# Patient Record
Sex: MALE | Race: WHITE | Employment: OTHER | URBAN - METROPOLITAN AREA
[De-identification: names, ages, dates, MRNs, and addresses within clinical notes are randomized per-mention and may not be internally consistent; named-entity substitution may affect disease eponyms.]

---

## 2017-06-20 ENCOUNTER — HOSPITAL ENCOUNTER (OUTPATIENT)
Dept: PHYSICAL THERAPY | Age: 72
Discharge: HOME | End: 2017-06-20
Payer: MEDICARE

## 2017-06-20 PROCEDURE — 97162 PT EVAL MOD COMPLEX 30 MIN: CPT

## 2017-06-20 PROCEDURE — G8978 MOBILITY CURRENT STATUS: HCPCS

## 2017-06-20 PROCEDURE — G8979 MOBILITY GOAL STATUS: HCPCS

## 2017-06-20 PROCEDURE — 97530 THERAPEUTIC ACTIVITIES: CPT
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
## 2017-06-23 ENCOUNTER — HOSPITAL ENCOUNTER (OUTPATIENT)
Dept: PHYSICAL THERAPY | Age: 72
Discharge: HOME | End: 2017-06-23
Payer: MEDICARE

## 2017-06-23 PROCEDURE — 97113 AQUATIC THERAPY/EXERCISES: CPT

## 2017-06-28 ENCOUNTER — HOSPITAL ENCOUNTER (OUTPATIENT)
Dept: PHYSICAL THERAPY | Age: 72
Discharge: HOME | End: 2017-06-28
Payer: MEDICARE

## 2017-06-28 PROCEDURE — 97110 THERAPEUTIC EXERCISES: CPT

## 2017-06-28 PROCEDURE — 97112 NEUROMUSCULAR REEDUCATION: CPT

## 2017-06-28 NOTE — PROGRESS NOTES
PT DAILY TREATMENT NOTE - [facility]     Patient Name: Marylee Alstrom  Date:2017  : 1945  [x]  Patient  Verified  Payor: Dana Sierra / Plan: [place] MEDICARE PART A & B / Product Type: Medicare /    In time:1100  Out time:1128  Total Treatment Time (min): 28  Total Timed Codes (min): 28  1:1 Treatment Time ( only): 28   Visit #: 3 of 8    Treatment Area: Left knee pain [M25.562]  Low back pain [M54.5]    SUBJECTIVE  Pain Level (0-10 scale): 0/10  Any medication changes, allergies to medications, adverse drug reactions, diagnosis change, or new procedure performed?: [x] No    [] Yes (see summary sheet for update)  Subjective functional status/changes:   [] No changes reported  I go to the [facility] 3 times a week for exercise. The balance work makes me nervous.   OBJECTIVE    Modality rationale:    Min Type Additional Details    [] Estim:  []Unatt       []IFC  []Premod                        []Other:  []w/ice   []w/heat  Position:  Location:    [] Estim: []Att    []TENS instruct  []NMES                    []Other:  []w/US   []w/ice   []w/heat  Position:  Location:    []  Traction: [] Cervical       []Lumbar                       [] Prone          []Supine                       []Intermittent   []Continuous Lbs:  [] before manual  [] after manual    []  Ultrasound: []Continuous   [] Pulsed                           []1MHz   []3MHz W/cm2:  Location:    []  Iontophoresis with dexamethasone         Location: [] Take home patch   [] In clinic    []  Ice     []  heat  []  Ice massage  []  Laser   []  Anodyne Position:  Location:    []  Laser with stim  []  Other:  Position:  Location:    []  Vasopneumatic Device Pressure:       [] lo [] med [] hi   Temperature: [] lo [] med [] hi   [] Skin assessment post-treatment:  []intact []redness- no adverse reaction    []redness  adverse reaction:      min []Eval                  []Re-Eval       18 min Therapeutic Exercise:  [x] See flow sheet :   Rationale: increase strength and improve coordination to improve the patients ability to improve gait and ADL's     min Therapeutic Activity:  []  See flow sheet :        10 min Neuromuscular Re-education:  [x]  See flow sheet :static and dynamic balance   Rationale: increase strength, improve coordination, improve balance and increase proprioception  to improve the patients ability to improve gait and balance for safety with ADL's     min Manual Therapy:          min Gait Training:  ___ feet with ___ device on level surfaces with ___ level of assist   Rationale: With   [] TE   [] TA   [] neuro   [] other: Patient Education: [x] Review HEP    [] Progressed/Changed HEP based on:   [] positioning   [] body mechanics   [] transfers   [] heat/ice application    [] other:      Other Objective/Functional Measures:      Pain Level (0-10 scale) post treatment: 0/10    ASSESSMENT/Changes in Function: Pt ambulates using B canes for balance and is challenged with narrow MARIO. Patient will continue to benefit from skilled PT services to modify and progress therapeutic interventions, address ROM deficits, address strength deficits, analyze and address soft tissue restrictions, analyze and cue movement patterns, address imbalance/dizziness and instruct in home and community integration to attain remaining goals. [x]  See Plan of Care  []  See progress note/recertification  []  See Discharge Summary         Progress towards goals / Updated goals:  Short Term Goals: STG- To be accomplished in 2 week(s):  1. Pt will be independent with HEP to encourage prophylaxis. Eval:to be dispensed visit 2-3  Current:issued aquatic HEP      Long Term Goals: LTG- To be accomplished in 4 week(s):  1. Pt will report back and knee pain at worst do not exceed 3/10 to normalize ADLs. Eval:7  Current: pt reporting no pain today and tolerates all therex well without pain      2.  Pt will be able to perform tandem stance for 15 sec with UE assist to improve ability to stand for ADLs with less UE support. Eval:difficulty assuming and holding  Current: progressing modified tandem      3. Pt hip ext strength will improve to 4+ to allow pt to walk with less pain and instability. Eval:4-  Current: 3 way hip PRE's introduced today      4. Pt FOTO score will increase by >10 points to show improvement in left knee and low back function.   Eval:36 knee, 47 low back  Current: will address at visit 5       PLAN  [x]  Upgrade activities as tolerated     [x]  Continue plan of care  []  Update interventions per flow sheet       []  Discharge due to:_  []  Other:_      Brendan Messer PTA 6/28/2017  10:58 AM    Future Appointments  Date Time Provider [place]   6/28/2017 11:00 AM LUBNA Preston [facility]   6/30/2017 11:00 AM Brendan Messer PTA [facility]   7/5/2017 11:00 AM Ca Rodriguez PT [facility]   7/7/2017 11:00 AM Brendan Messer, PTA [facility]   7/12/2017 11:00 AM Shane Paredes PT [facility]   7/14/2017 11:00 AM Brendan Messer, PTA [facility]   7/19/2017 11:00 AM Shane Paredes, PT [facility]   7/21/2017 11:00 AM Brendan Messer, PTA [facility]

## 2017-06-30 ENCOUNTER — HOSPITAL ENCOUNTER (OUTPATIENT)
Dept: PHYSICAL THERAPY | Age: 72
Discharge: HOME | End: 2017-06-30
Payer: MEDICARE

## 2017-06-30 PROCEDURE — 97113 AQUATIC THERAPY/EXERCISES: CPT

## 2017-07-05 ENCOUNTER — HOSPITAL ENCOUNTER (OUTPATIENT)
Dept: PHYSICAL THERAPY | Age: 72
Discharge: HOME | End: 2017-07-05
Payer: MEDICARE

## 2017-07-05 PROCEDURE — 97110 THERAPEUTIC EXERCISES: CPT

## 2017-07-05 PROCEDURE — 97112 NEUROMUSCULAR REEDUCATION: CPT

## 2017-07-05 NOTE — PROGRESS NOTES
PT DAILY TREATMENT NOTE - [facility]     Patient Name: Juan Pablo Godoy  Date:2017  : 1945  [x]  Patient  Verified  Payor: [place] MEDICARE / Plan: [place] MEDICARE PART A & B / Product Type: Medicare /    In time:1045  Out time:1125  Total Treatment Time (min): 40  Total Timed Codes (min): 40  1:1 Treatment Time ( only): 40   Visit #: 5 of 8    Treatment Area: Left knee pain [M25.562]  Low back pain [M54.5]    SUBJECTIVE  Pain Level (0-10 scale): 2-3/10  Any medication changes, allergies to medications, adverse drug reactions, diagnosis change, or new procedure performed?: [x] No    [] Yes (see summary sheet for update)  Subjective functional status/changes:   [] No changes reported  Moving slow today.     OBJECTIVE    Modality rationale:    Min Type Additional Details    [] Estim:  []Unatt       []IFC  []Premod                        []Other:  []w/ice   []w/heat  Position:  Location:    [] Estim: []Att    []TENS instruct  []NMES                    []Other:  []w/US   []w/ice   []w/heat  Position:  Location:    []  Traction: [] Cervical       []Lumbar                       [] Prone          []Supine                       []Intermittent   []Continuous Lbs:  [] before manual  [] after manual    []  Ultrasound: []Continuous   [] Pulsed                           []1MHz   []3MHz W/cm2:  Location:    []  Iontophoresis with dexamethasone         Location: [] Take home patch   [] In clinic    []  Ice     []  heat  []  Ice massage  []  Laser   []  Anodyne Position:  Location:    []  Laser with stim  []  Other:  Position:  Location:    []  Vasopneumatic Device Pressure:       [] lo [] med [] hi   Temperature: [] lo [] med [] hi   [] Skin assessment post-treatment:  []intact []redness- no adverse reaction    []redness  adverse reaction:      min []Eval                  []Re-Eval       30 min Therapeutic Exercise:  [x] See flow sheet :   Rationale: increase strength to improve the patients ability to normalize ADL's     min Therapeutic Activity:  []  See flow sheet :        10 min Neuromuscular Re-education:  [x]  See flow sheet :   Rationale: improve coordination, improve balance and increase proprioception  to improve the patients ability to normalize gait and function     min Manual Therapy:          min Gait Training:  ___ feet with ___ device on level surfaces with ___ level of assist   Rationale: With   [] TE   [] TA   [] neuro   [] other: Patient Education: [x] Review HEP    [] Progressed/Changed HEP based on:   [] positioning   [] body mechanics   [] transfers   [] heat/ice application    [] other:      Other Objective/Functional Measures: see goal review     Pain Level (0-10 scale) post treatment: 2-3/10    ASSESSMENT/Changes in Function: FOTO score improving for back > knee. Static standing tandem balance also improving. Patient will continue to benefit from skilled PT services to modify and progress therapeutic interventions, address functional mobility deficits, address ROM deficits, address strength deficits, analyze and address soft tissue restrictions, analyze and cue movement patterns, assess and modify postural abnormalities and address imbalance/dizziness to attain remaining goals. []  See Plan of Care  []  See progress note/recertification  []  See Discharge Summary         Progress towards goals / Updated goals:  Short Term Goals: STG- To be accomplished in 2 week(s):  1. Pt will be independent with HEP to encourage prophylaxis. Eval:to be dispensed visit 2-3  Current: progressing with pt also participating in separate exercise class      Long Term Goals: LTG- To be accomplished in 4 week(s):  1. Pt will report back and knee pain at worst do not exceed 3/10 to normalize ADLs. Eval:7  Current: progressin/10 at worst      2. Pt will be able to perform tandem stance for 15 sec with UE assist to improve ability to stand for ADLs with less UE support.   Eval:difficulty assuming and holding  Current: progressing       3. Pt hip ext strength will improve to 4+ to allow pt to walk with less pain and instability. Eval:4-  Current: no change      4. Pt FOTO score will increase by >10 points to show improvement in left knee and low back function.   Eval:36 knee, 47 low back  Current: progressin for knee, 54 for back       PLAN  [x]  Upgrade activities as tolerated     [x]  Continue plan of care  []  Update interventions per flow sheet       []  Discharge due to:_  []  Other:_      Cipriano Jacobo PT 2017  10:46 AM    Future Appointments  Date Time Provider [place]   2017 11:00 AM AVIVA Coles [facility]   2017 11:00 AM LUBNA Ta [facility]   2017 11:00 AM Gian Muro PT LAZARO [facility]   2017 11:00 AM LUBNA [facility]   2017 11:00 AM Gian Muro PT [facility]   2017 11:00 AM LUBNA [facility]

## 2017-07-07 ENCOUNTER — HOSPITAL ENCOUNTER (OUTPATIENT)
Dept: PHYSICAL THERAPY | Age: 72
Discharge: HOME | End: 2017-07-07
Payer: MEDICARE

## 2017-07-07 PROCEDURE — 97113 AQUATIC THERAPY/EXERCISES: CPT

## 2017-07-12 ENCOUNTER — HOSPITAL ENCOUNTER (OUTPATIENT)
Dept: PHYSICAL THERAPY | Age: 72
Discharge: HOME | End: 2017-07-12
Payer: MEDICARE

## 2017-07-12 PROCEDURE — 97530 THERAPEUTIC ACTIVITIES: CPT

## 2017-07-12 PROCEDURE — G8978 MOBILITY CURRENT STATUS: HCPCS

## 2017-07-12 PROCEDURE — G8979 MOBILITY GOAL STATUS: HCPCS

## 2017-07-12 PROCEDURE — 97112 NEUROMUSCULAR REEDUCATION: CPT

## 2017-07-12 NOTE — PROGRESS NOTES
[facility] at [address] [place] [address]  Phone: [phone]   Fax: [phone]    Medicare Progress Report      Patient name: Aly Marinelli     Start of Care: 17  Referral source: Odilon Figueroa*    : 1945  Medical/Treatment Diagnosis: Left knee pain [M25.562]  Low back pain [M54.5]  Onset Date:initial injury , worsening ~2017  Prior Hospitalization: see medical history   Provider#: [id number]   Comorbidities: chronic low back pain, BMI, HTN, history of cancer   Prior Level of Function: instability with gait and pain worsening in last 3 mo, walking with [facility] for 6+ years, in last 3 mo switched to walking with [facility] and knee brace  Medications: Verified on Patient Summary List    Visits from Start of Care: 7    Missed Visits: 0  Reporting Period : 17 to 17    Subjective Reports: Short Term Goals: STG- To be accomplished in 2 week(s):  1. Pt will be independent with HEP to encourage prophylaxis. Eval:to be dispensed visit 2-3  Current:  Reports compliance-- progressing      Long Term Goals: LTG- To be accomplished in 4 week(s):  1. Pt will report back and knee pain at worst do not exceed 3/10 to normalize ADLs. Eval:7  Current: 2-3 on average, sometimes higher pain levels back>knee currently-- progressing      2. Pt will be able to perform tandem stance for 15 sec with UE assist to improve ability to stand for ADLs with less UE support. Eval:difficulty assuming and holding  Current: 15 sec but several errors-- progressing      3. Pt hip ext strength will improve to 4+ to allow pt to walk with less pain and instability. Eval:4-  Current: challenged with hip ext in standing- pain, progressing slowly but no official measure taken      4. Pt FOTO score will increase by >10 points to show improvement in left knee and low back function.   Eval:36 knee, 47 low back  Current: 38 knee, 65 low back --progressing    Key functional changes: FOTO 2 point improvement in knee, 18 improvement in l-spine  Progressing with balance  No falls since IE, remains ambulating with abel SPC      Problems/ barriers to goal attainment: none     Assessment / Recommendations:Pt has attended 7 PT appointments since IE, progressing toward goals. Pt reports remains with LBP and imbalance. Due to deficits, pt would continue to benefit from skilled PT to improve balance, pain, and function. Problem List: pain affecting function, decrease ROM, decrease strength, impaired gait/ balance, decrease ADL/ functional abilitiies, decrease activity tolerance, decrease flexibility/ joint mobility and decrease transfer abilities   Treatment Plan: Therapeutic exercise, Therapeutic activities, Neuromuscular re-education, Physical agent/modality, Gait/balance training, Manual therapy, Aquatic therapy, Patient education, Functional mobility training and Home safety training    Patient Goal (s) has been updated and includes: work on my balance    Updated Goals to be accomplished in 4 weeks:  Cont with unmet goals    Frequency / Duration: Patient to be seen 2 times per week for 4 weeks:    G-Codes (GP)  G-Codes (GP)  Mobility  [id number] Current  CK= 40-59%  [id number] Goal  CJ= 20-39%      The severity rating is based on clinical judgment and the FOTO score. Certification Period: 6/20/17-8/11/17  The severity rating is based on clinical judgement and the FOTO score.       Maria Dolores Pichardo, PT 7/12/2017 1:04 PM

## 2017-07-12 NOTE — PROGRESS NOTES
PT DAILY TREATMENT NOTE - [facility]     Patient Name: Cirilo Chan  Date:2017  : 1945  [x]  Patient  Verified  Payor: Aron Felton / Plan: [place] PART A & B / Product Type: Medicare /    In time:1100  Out time:1135  Total Treatment Time (min): 35  Total Timed Codes (min): 35  1:1 Treatment Time ( [address] only): 35   Visit #: 7 of 8    Treatment Area: Left knee pain [M25.562]  Low back pain [M54.5]    SUBJECTIVE  Pain Level (0-10 scale): 1.5  Any medication changes, allergies to medications, adverse drug reactions, diagnosis change, or new procedure performed?: [x] No    [] Yes (see summary sheet for update)  Subjective functional status/changes:   [] No changes reported  \"It isn't getting any worse. \"    OBJECTIVE    20 min Therapeutic Exercise:  [x] See flow sheet :   Rationale: increase ROM, increase strength, improve coordination, improve balance and increase proprioception to improve the patients ability to perform ADL's with reduced pain levels. 15 min Neuromuscular Re-education:  [x]  See flow sheet :   Rationale: increase ROM, increase strength, improve coordination, improve balance and increase proprioception  to improve the patients ability to perform ADL's with reduced pain levels. With   [] TE   [] TA   [] neuro   [] other: Patient Education: [x] Review HEP    [] Progressed/Changed HEP based on:   [] positioning   [] body mechanics   [] transfers   [] heat/ice application    [] other:      Other Objective/Functional Measures:      Pain Level (0-10 scale) post treatment: 1.5-2    ASSESSMENT/Changes in Function: Pt tolerated session well. Remains very fearful of tandem balance but improving stability per object findings. Pt notes minor subjective improvements. No falls since IE.       Patient will continue to benefit from skilled PT services to modify and progress therapeutic interventions, address functional mobility deficits, address ROM deficits, address strength deficits, analyze and address soft tissue restrictions, analyze and cue movement patterns, analyze and modify body mechanics/ergonomics, assess and modify postural abnormalities and instruct in home and community integration to attain remaining goals. []  See Plan of Care  []  See progress note/recertification  []  See Discharge Summary         Progress towards goals / Updated goals:  Short Term Goals: STG- To be accomplished in 2 week(s):  1. Pt will be independent with HEP to encourage prophylaxis. Eval:to be dispensed visit 2-3  Current:  Reports compliance-- progressing      Long Term Goals: LTG- To be accomplished in 4 week(s):  1. Pt will report back and knee pain at worst do not exceed 3/10 to normalize ADLs. Eval:7  Current: 2-3 on average, sometimes higher pain levels back>knee currently-- progressing      2. Pt will be able to perform tandem stance for 15 sec with UE assist to improve ability to stand for ADLs with less UE support. Eval:difficulty assuming and holding  Current: 15 sec but several errors-- progressing      3. Pt hip ext strength will improve to 4+ to allow pt to walk with less pain and instability. Eval:4-  Current: challenged with hip ext in standing- pain, progressing slowly but no official measure taken      4. Pt FOTO score will increase by >10 points to show improvement in left knee and low back function.   Eval:36 knee, 47 low back  Current: 38 knee, 65 low back --progressing    PLAN  [x]  Upgrade activities as tolerated     []  Continue plan of care  []  Update interventions per flow sheet       []  Discharge due to:_  []  Other:_      Sherren Grinder, PT 7/12/2017  11:05 AM    Future Appointments  Date Time Provider Kai Carbajal   7/14/2017 12:00 PM LUBNA Hernandez THE [facility]   7/19/2017 11:00 AM Sherren Grinder, PT [facility]   7/21/2017 11:00 AM LUBNA Hernandez THE [facility]

## 2017-07-14 ENCOUNTER — HOSPITAL ENCOUNTER (OUTPATIENT)
Dept: PHYSICAL THERAPY | Age: 72
Discharge: HOME | End: 2017-07-14
Payer: MEDICARE

## 2017-07-14 PROCEDURE — 97113 AQUATIC THERAPY/EXERCISES: CPT

## 2017-07-19 ENCOUNTER — HOSPITAL ENCOUNTER (OUTPATIENT)
Dept: PHYSICAL THERAPY | Age: 72
Discharge: HOME | End: 2017-07-19
Payer: MEDICARE

## 2017-07-19 PROCEDURE — 97110 THERAPEUTIC EXERCISES: CPT

## 2017-07-19 PROCEDURE — 97112 NEUROMUSCULAR REEDUCATION: CPT

## 2017-07-19 PROCEDURE — 97530 THERAPEUTIC ACTIVITIES: CPT

## 2017-07-19 NOTE — PROGRESS NOTES
PT DAILY TREATMENT NOTE - [facility]     Patient Name: Georgia Weinstein  Date:2017  : 1945  [x]  Patient  Verified  Payor: Nando Font / Plan: [place] MED[place]ARE PART A & B / Product Type: Medicare /    In time:1040  Out time:1124  Total Treatment Time (min): 44  Total Timed Codes (min): 44  1:1 Treatment Time ( only): 40   Visit #: 9 of 15    Treatment Area: Left knee pain [M25.562]  Low back pain [M54.5]    SUBJECTIVE  Pain Level (0-10 scale): 0  Any medication changes, allergies to medications, adverse drug reactions, diagnosis change, or new procedure performed?: [x] No    [] Yes (see summary sheet for update)  Subjective functional status/changes:   [] No changes reported  \"Just sitting here I don't have any pain. \"    OBJECTIVE    22 min Therapeutic Exercise:  [x] See flow sheet :   Rationale: increase ROM, increase strength, improve coordination, improve balance and increase proprioception to improve the patients ability to perform ADL's with reduced pain levels. 8 min Therapeutic Activity:  [x]  See flow sheet :   Rationale: increase ROM, increase strength, improve coordination, improve balance and increase proprioception  to improve the patients ability to perform ADL's with reduced pain levels. 14 min Neuromuscular Re-education:  [x]  See flow sheet :   Rationale: increase ROM, increase strength, improve coordination, improve balance and increase proprioception  to improve the patients ability to perform ADL's with reduced pain levels.          With   [] TE   [] TA   [] neuro   [] other: Patient Education: [x] Review HEP    [] Progressed/Changed HEP based on:   [] positioning   [] body mechanics   [] transfers   [] heat/ice application    [] other:      Other Objective/Functional Measures: 0 errors with tandem stance     Pain Level (0-10 scale) post treatment: 0    ASSESSMENT/Changes in Function: Instructed, demo, and practiced use of lumbar brace given by MD.  Pt progressing toward all goals and has senior class pt attends 3 days a week. Pt verbalized confidence to d/c to HEP with workout classes. To update FOTO and HEP and d/c next visit. Pt reports significant improvements since IE. Patient will continue to benefit from skilled PT services to modify and progress therapeutic interventions, address functional mobility deficits, address ROM deficits, address strength deficits, analyze and address soft tissue restrictions, analyze and cue movement patterns, analyze and modify body mechanics/ergonomics, assess and modify postural abnormalities and instruct in home and community integration to attain remaining goals. []  See Plan of Care  [x]  See progress note/recertification  []  See Discharge Summary         Progress towards goals / Updated goals:  Short Term Goals: STG- To be accomplished in 2 week(s):  1. Pt will be independent with HEP to encourage prophylaxis. Eval:to be dispensed visit 2-3  Last PN:  Reports compliance-- progressing      Long Term Goals: LTG- To be accomplished in 4 week(s):  1. Pt will report back and knee pain at worst do not exceed 3/10 to normalize ADLs. Eval:7  Last PN: 2-3 on average, sometimes higher pain levels back>knee currently-- progressing  Current: improving pain levels cont to be reported      2. Pt will be able to perform tandem stance for 15 sec with UE assist to improve ability to stand for ADLs with less UE support. Eval:difficulty assuming and holding  Last PN: 15 sec but several errors-- progressing  Current: 20 sec no error but shaking noted--MET      3. Pt hip ext strength will improve to 4+ to allow pt to walk with less pain and instability. Eval:4-  Last PN: challenged with hip ext in standing- pain, progressing slowly but no official measure taken  Current: 4/5-- progressing      4. Pt FOTO score will increase by >10 points to show improvement in left knee and low back function.   Eval:36 knee, 47 low back  Last PN: 38 knee, 65 low back --progressing  Current: address visit 10       PLAN  []  Upgrade activities as tolerated     []  Continue plan of care  []  Update interventions per flow sheet       [x]  Discharge: next visit with FOTO assessment  []  Other:_      Tresa Jurado, AVIVA 7/19/2017  10:36 AM    Future Appointments  Date Time Provider Kai Carbajal   7/19/2017 11:00 AM Sera Tristan [facility]   7/21/2017 12:30 PM John Morrison PTA [facility]

## 2017-12-23 ENCOUNTER — HOSPITAL ENCOUNTER (EMERGENCY)
Age: 72
Discharge: HOME | End: 2017-12-23
Attending: EMERGENCY MEDICINE
Payer: MEDICARE

## 2017-12-23 VITALS
DIASTOLIC BLOOD PRESSURE: 89 MMHG | HEART RATE: 69 BPM | HEIGHT: 68 IN | WEIGHT: 228 LBS | TEMPERATURE: 97.6 F | OXYGEN SATURATION: 97 % | BODY MASS INDEX: 34.56 KG/M2 | SYSTOLIC BLOOD PRESSURE: 138 MMHG | RESPIRATION RATE: 18 BRPM

## 2017-12-23 DIAGNOSIS — M54.16 LUMBAR BACK PAIN WITH RADICULOPATHY AFFECTING RIGHT LOWER EXTREMITY: Primary | ICD-10-CM

## 2017-12-23 PROCEDURE — 99282 EMERGENCY DEPT VISIT SF MDM: CPT

## 2017-12-23 PROCEDURE — 96372 THER/PROPH/DIAG INJ SC/IM: CPT

## 2017-12-23 PROCEDURE — 74011250636 HC RX REV CODE- 250/636: Performed by: PHYSICIAN ASSISTANT

## 2017-12-23 RX ORDER — GUAIFENESIN 100 MG/5ML
81 LIQUID (ML) ORAL DAILY
Status: ON HOLD | COMMUNITY
End: 2020-01-27

## 2017-12-23 RX ORDER — PROPRANOLOL HYDROCHLORIDE 20 MG/1
20 TABLET ORAL DAILY
Status: ON HOLD | COMMUNITY
End: 2020-01-27 | Stop reason: DRUGHIGH

## 2017-12-23 RX ORDER — LANOLIN ALCOHOL/MO/W.PET/CERES
1 CREAM (GRAM) TOPICAL DAILY
COMMUNITY

## 2017-12-23 RX ORDER — NEFAZODONE HYDROCHLORIDE 250 MG/1
250 TABLET ORAL 2 TIMES DAILY
COMMUNITY

## 2017-12-23 RX ORDER — MELATONIN 5 MG
5 CAPSULE ORAL
Status: ON HOLD | COMMUNITY
End: 2020-01-27

## 2017-12-23 RX ORDER — PRAVASTATIN SODIUM 40 MG/1
40 TABLET ORAL
COMMUNITY

## 2017-12-23 RX ORDER — PREDNISONE 10 MG/1
TABLET ORAL
Qty: 21 TAB | Refills: 0 | Status: ON HOLD | OUTPATIENT
Start: 2017-12-23 | End: 2020-01-27

## 2017-12-23 RX ORDER — CYCLOBENZAPRINE HCL 10 MG
10 TABLET ORAL
Status: ON HOLD | COMMUNITY
End: 2020-01-27

## 2017-12-23 RX ORDER — GLUCOSAMINE SULFATE 1500 MG
POWDER IN PACKET (EA) ORAL DAILY
COMMUNITY

## 2017-12-23 RX ADMIN — METHYLPREDNISOLONE SODIUM SUCCINATE 125 MG: 125 INJECTION, POWDER, FOR SOLUTION INTRAMUSCULAR; INTRAVENOUS at 13:50

## 2017-12-23 NOTE — ED PROVIDER NOTES
EMERGENCY DEPARTMENT HISTORY AND PHYSICAL EXAM    Date: 12/23/2017  Patient Name: Constance Thomas    History of Presenting Illness     Chief Complaint   Patient presents with    Back Pain         History Provided By: Patient    Chief Complaint: Hip pain  Duration: 3 Days  Timing:  Worsening  Location: Right Hip  Severity: 2 out of 10  Modifying Factors: pain worsens when walking  Associated Symptoms: HA    Additional History (Context):   1:11 PM  Constance Thomas is a 71 y.o. male with PMHx of HTN and chronic back pain presents to the emergency department C/O right hip pain onset 3 days. Associated sxs include HA. Pt states that he received lidocaine injections in left lower back 3 days ago by Dr. Mauro Young (pain management) for his chronic lower back pain. These injections were to determine if he is a candidate for radiofrequency ablation. He was seen PTA at [address] and was directed to the ED. Since the initial pain it has continuously worsened until last night where he felt a sudden increase of pain (9/10) last night with some radiation and tingling in his right foot; that has since resolved. Pt denies injury or fall, fever, bowel or bladder incontinence, pain at injection site, and any other sxs or complaints. - smoker, - EtOH use, - illicit drug use     PCP: PROVIDER UNKNOWN    Current Outpatient Prescriptions   Medication Sig Dispense Refill    nefazodone (SERZONE) 250 mg tablet Take 250 mg by mouth two (2) times a day.  pravastatin (PRAVACHOL) 40 mg tablet Take 40 mg by mouth nightly.  cyclobenzaprine (FLEXERIL) 10 mg tablet Take 10 mg by mouth three (3) times daily as needed for Muscle Spasm(s).  cholecalciferol (VITAMIN D3) 1,000 unit cap Take  by mouth daily.  aspirin 81 mg chewable tablet Take 81 mg by mouth daily.  propranolol (INDERAL) 20 mg tablet Take 20 mg by mouth three (3) times daily.  melatonin 5 mg cap capsule Take 5 mg by mouth nightly.       glucosamine-chondroitin (ARTHX) 500-400 mg cap Take 1 Cap by mouth daily.  predniSONE (STERAPRED DS) 10 mg dose pack Use per pack instructions. 21 Tab 0       Past History     Past Medical History:  Past Medical History:   Diagnosis Date    Arthritis     Gastrointestinal disorder     Gastric sleeve    History of penile implant 2014    Hypertension        Past Surgical History:  Past Surgical History:   Procedure Laterality Date    HX KNEE REPLACEMENT Right        Family History:  History reviewed. No pertinent family history. Social History:  Social History   Substance Use Topics    Smoking status: Never Smoker    Smokeless tobacco: Never Used    Alcohol use No       Allergies: Allergies   Allergen Reactions    Sulfa (Sulfonamide Antibiotics) Rash         Review of Systems   Review of Systems   Constitutional: Negative for fever. Musculoskeletal: Positive for arthralgias (right hip) and back pain. No pain at injection site   Neurological: Positive for headaches. All other systems reviewed and are negative. Physical Exam     Vitals:    12/23/17 1230   BP: 138/89   Pulse: 69   Resp: 18   Temp: 97.6 °F (36.4 °C)   SpO2: 97%   Weight: 103.4 kg (228 lb)   Height: 5' 8\" (1.727 m)     Physical Exam   Constitutional: He is oriented to person, place, and time. He appears well-developed. Obese, in mild-moderate pain   Neck: Normal range of motion. Neck supple. Cardiovascular: Normal rate, regular rhythm and normal heart sounds. Pulmonary/Chest: Effort normal and breath sounds normal.   Abdominal: Soft. He exhibits no distension. There is no tenderness. Musculoskeletal:        Cervical back: Normal.        Thoracic back: Normal.        Back:    BLE: sensation intact, 2+DP pulses, no foot drop, 5/5 strength dorsi/plantar flexion of both feet. No calf tenderness or swelling. Neurological: He is alert and oriented to person, place, and time. Skin: Skin is warm and dry.  No rash noted. He is not diaphoretic. No erythema. Psychiatric: He has a normal mood and affect. His behavior is normal.   Nursing note and vitals reviewed. Diagnostic Study Results     Labs -   No results found for this or any previous visit (from the past 12 hour(s)). Radiologic Studies -   No orders to display     CT Results  (Last 48 hours)    None        CXR Results  (Last 48 hours)    None            Medical Decision Making   I am the first provider for this patient. I reviewed the vital signs, available nursing notes, past medical history, past surgical history, family history and social history. Vital Signs-Reviewed the patient's vital signs. Pulse Oximetry Analysis - 97% on RA     Cardiac Monitor:  Rate: 69 bpm  Rhythm: NSR    Records Reviewed: Nursing Notes    Provider Notes (Medical Decision Making):     Procedures:  Procedures    ED Course:   1:11 PM Initial assessment performed. The patients presenting problems have been discussed, and they are in agreement with the care plan formulated and outlined with them. I have encouraged them to ask questions as they arise throughout their visit. PROGRESS NOTE:   1:30 PM  Unable to contact Dr. Ryan Roger; he is not listed through [facility] transfer center and office line just has option for voicemail. Will discuss with ED attending. Written by DENITA Gupta .    CONSULT NOTE:   1:36 PM  Glenn Holcomb PA-C spoke with Carina Ron MD   Specialty: ED medicine  Discussed pt's hx, disposition, and available diagnostic and imaging results  in person. Reviewed care plans. Agrees sx are more consistent with sciatica, further imaging not required at this time, particularly due to sx on opposite side of injection. Diagnosis and Disposition       DISCHARGE NOTE:  1:45 PM  Anabel Broody  results have been reviewed with him. He has been counseled regarding his diagnosis, treatment, and plan.   He verbally conveys understanding and agreement of the signs, symptoms, diagnosis, treatment and prognosis and additionally agrees to follow up as discussed. He also agrees with the care-plan and conveys that all of his questions have been answered. I have also provided discharge instructions for him that include: educational information regarding their diagnosis and treatment, and list of reasons why they would want to return to the ED prior to their follow-up appointment, should his condition change. He has been provided with education for proper emergency department utilization. CLINICAL IMPRESSION:    1. Lumbar back pain with radiculopathy affecting right lower extremity        Discussion:    PLAN:  1. D/C Home  2. Discharge Medication List as of 12/23/2017  1:46 PM      START taking these medications    Details   predniSONE (STERAPRED DS) 10 mg dose pack Use per pack instructions. , Print, Disp-21 Tab, R-0         CONTINUE these medications which have NOT CHANGED    Details   nefazodone (SERZONE) 250 mg tablet Take 250 mg by mouth two (2) times a day., Historical Med      pravastatin (PRAVACHOL) 40 mg tablet Take 40 mg by mouth nightly., Historical Med      cyclobenzaprine (FLEXERIL) 10 mg tablet Take 10 mg by mouth three (3) times daily as needed for Muscle Spasm(s). , Historical Med      cholecalciferol (VITAMIN D3) 1,000 unit cap Take  by mouth daily. , Historical Med      aspirin 81 mg chewable tablet Take 81 mg by mouth daily. , Historical Med      propranolol (INDERAL) 20 mg tablet Take 20 mg by mouth three (3) times daily. , Historical Med      melatonin 5 mg cap capsule Take 5 mg by mouth nightly., Historical Med      glucosamine-chondroitin (ARTHX) 500-400 mg cap Take 1 Cap by mouth daily. , Historical Med           3. Follow-up Information     Follow up With Details Comments Contact Info    Jack Cha MD Schedule an appointment as soon as possible for a visit in 2 days For follow up with pain management [address] [facility]      [facility] EMERGENCY DEPT  As needed, if symptoms worsen [address] [id number]  [phone]        _______________________________    Attestations: This note is prepared by Nasima Villa, acting as Scribe for [employer]. [employer], CH[employer]IS:  The scribe's documentation has been prepared under my direction and personally reviewed by me in its entirety.   I confirm that the note above accurately reflects all work, treatment, procedures, and medical decision making performed by me.  _______________________________

## 2017-12-23 NOTE — ED TRIAGE NOTES
Pt reports hx of chronic pain pain r/t arthritis. Pt reports having a lidocaine shot on Wednesday with Dr. Rach Joy as part of the the Radiofrequency ablation procedure for his back. Pt reports next appointment is 1/19/18. Pt reports increased pain and swelling to low back since the procedure. Sepsis Screening completed    (  )Patient meets SIRS criteria. ( X )Patient does not meet SIRS criteria.       SIRS Criteria is achieved when two or more of the following are present   Temperature < 96.8°F (36°C) or > 100.9°F (38.3°C)   Heart Rate > 90 beats per minute   Respiratory Rate > 20 breaths per minute   WBC count > 12,000 or <4,000 or > 10% bands

## 2017-12-23 NOTE — DISCHARGE INSTRUCTIONS

## 2018-10-05 ENCOUNTER — HOSPITAL ENCOUNTER (EMERGENCY)
Age: 73
Discharge: HOME | End: 2018-10-05
Attending: EMERGENCY MEDICINE
Payer: MEDICARE

## 2018-10-05 ENCOUNTER — APPOINTMENT (OUTPATIENT)
Dept: CT IMAGING | Age: 73
End: 2018-10-05
Attending: EMERGENCY MEDICINE
Payer: MEDICARE

## 2018-10-05 VITALS
DIASTOLIC BLOOD PRESSURE: 79 MMHG | HEIGHT: 68 IN | SYSTOLIC BLOOD PRESSURE: 118 MMHG | HEART RATE: 54 BPM | WEIGHT: 240 LBS | RESPIRATION RATE: 18 BRPM | TEMPERATURE: 98.5 F | BODY MASS INDEX: 36.37 KG/M2 | OXYGEN SATURATION: 100 %

## 2018-10-05 DIAGNOSIS — R10.9 RIGHT FLANK PAIN: Primary | ICD-10-CM

## 2018-10-05 DIAGNOSIS — M79.18 MUSCULOSKELETAL PAIN: ICD-10-CM

## 2018-10-05 LAB
ALBUMIN SERPL-MCNC: 3.2 G/DL (ref 3.4–5)
ALBUMIN/GLOB SERPL: 1 {RATIO} (ref 0.8–1.7)
ALP SERPL-CCNC: 53 U/L (ref 45–117)
ALT SERPL-CCNC: 19 U/L (ref 16–61)
ANION GAP SERPL CALC-SCNC: 7 MMOL/L (ref 3–18)
APPEARANCE UR: CLEAR
AST SERPL-CCNC: 17 U/L (ref 15–37)
BACTERIA URNS QL MICRO: ABNORMAL /HPF
BASOPHILS # BLD: 0 K/UL (ref 0–0.1)
BASOPHILS NFR BLD: 0 % (ref 0–2)
BILIRUB SERPL-MCNC: 0.4 MG/DL (ref 0.2–1)
BILIRUB UR QL: NEGATIVE
BUN SERPL-MCNC: 10 MG/DL (ref 7–18)
BUN/CREAT SERPL: 11 (ref 12–20)
CALCIUM SERPL-MCNC: 8.1 MG/DL (ref 8.5–10.1)
CHLORIDE SERPL-SCNC: 107 MMOL/L (ref 100–108)
CK MB CFR SERPL CALC: NORMAL % (ref 0–4)
CK MB SERPL-MCNC: <1 NG/ML (ref 5–25)
CK SERPL-CCNC: 46 U/L (ref 39–308)
CO2 SERPL-SCNC: 26 MMOL/L (ref 21–32)
COLOR UR: YELLOW
CREAT SERPL-MCNC: 0.9 MG/DL (ref 0.6–1.3)
DIFFERENTIAL METHOD BLD: ABNORMAL
EOSINOPHIL # BLD: 0.2 K/UL (ref 0–0.4)
EOSINOPHIL NFR BLD: 3 % (ref 0–5)
EPITH CASTS URNS QL MICRO: ABNORMAL /LPF (ref 0–5)
ERYTHROCYTE [DISTWIDTH] IN BLOOD BY AUTOMATED COUNT: 14.6 % (ref 11.6–14.5)
GLOBULIN SER CALC-MCNC: 3.3 G/DL (ref 2–4)
GLUCOSE SERPL-MCNC: 92 MG/DL (ref 74–99)
GLUCOSE UR STRIP.AUTO-MCNC: NEGATIVE MG/DL
HCT VFR BLD AUTO: 39.7 % (ref 36–48)
HGB BLD-MCNC: 12.4 G/DL (ref 13–16)
HGB UR QL STRIP: NEGATIVE
KETONES UR QL STRIP.AUTO: NEGATIVE MG/DL
LEUKOCYTE ESTERASE UR QL STRIP.AUTO: ABNORMAL
LYMPHOCYTES # BLD: 2.4 K/UL (ref 0.9–3.6)
LYMPHOCYTES NFR BLD: 40 % (ref 21–52)
MAGNESIUM SERPL-MCNC: 2 MG/DL (ref 1.6–2.6)
MCH RBC QN AUTO: 25.1 PG (ref 24–34)
MCHC RBC AUTO-ENTMCNC: 31.2 G/DL (ref 31–37)
MCV RBC AUTO: 80.4 FL (ref 74–97)
MONOCYTES # BLD: 0.4 K/UL (ref 0.05–1.2)
MONOCYTES NFR BLD: 6 % (ref 3–10)
NEUTS SEG # BLD: 3.1 K/UL (ref 1.8–8)
NEUTS SEG NFR BLD: 51 % (ref 40–73)
NITRITE UR QL STRIP.AUTO: NEGATIVE
PH UR STRIP: 7.5 [PH] (ref 5–8)
PLATELET # BLD AUTO: 272 K/UL (ref 135–420)
PMV BLD AUTO: 9.3 FL (ref 9.2–11.8)
POTASSIUM SERPL-SCNC: 4.1 MMOL/L (ref 3.5–5.5)
PROT SERPL-MCNC: 6.5 G/DL (ref 6.4–8.2)
PROT UR STRIP-MCNC: NEGATIVE MG/DL
RBC # BLD AUTO: 4.94 M/UL (ref 4.7–5.5)
RBC #/AREA URNS HPF: ABNORMAL /HPF (ref 0–5)
SODIUM SERPL-SCNC: 140 MMOL/L (ref 136–145)
SP GR UR REFRACTOMETRY: 1.02 (ref 1–1.03)
TROPONIN I SERPL-MCNC: <0.02 NG/ML (ref 0–0.06)
UROBILINOGEN UR QL STRIP.AUTO: 1 EU/DL (ref 0.2–1)
WBC # BLD AUTO: 6.1 K/UL (ref 4.6–13.2)
WBC URNS QL MICRO: ABNORMAL /HPF (ref 0–5)

## 2018-10-05 PROCEDURE — 74011250636 HC RX REV CODE- 250/636: Performed by: EMERGENCY MEDICINE

## 2018-10-05 PROCEDURE — 96374 THER/PROPH/DIAG INJ IV PUSH: CPT

## 2018-10-05 PROCEDURE — 96375 TX/PRO/DX INJ NEW DRUG ADDON: CPT

## 2018-10-05 PROCEDURE — 81001 URINALYSIS AUTO W/SCOPE: CPT | Performed by: EMERGENCY MEDICINE

## 2018-10-05 PROCEDURE — 83735 ASSAY OF MAGNESIUM: CPT | Performed by: EMERGENCY MEDICINE

## 2018-10-05 PROCEDURE — 85025 COMPLETE CBC W/AUTO DIFF WBC: CPT | Performed by: EMERGENCY MEDICINE

## 2018-10-05 PROCEDURE — 82550 ASSAY OF CK (CPK): CPT | Performed by: EMERGENCY MEDICINE

## 2018-10-05 PROCEDURE — 93005 ELECTROCARDIOGRAM TRACING: CPT

## 2018-10-05 PROCEDURE — 99284 EMERGENCY DEPT VISIT MOD MDM: CPT

## 2018-10-05 PROCEDURE — 80053 COMPREHEN METABOLIC PANEL: CPT | Performed by: EMERGENCY MEDICINE

## 2018-10-05 PROCEDURE — 96361 HYDRATE IV INFUSION ADD-ON: CPT

## 2018-10-05 PROCEDURE — 74011636320 HC RX REV CODE- 636/320: Performed by: EMERGENCY MEDICINE

## 2018-10-05 PROCEDURE — 74177 CT ABD & PELVIS W/CONTRAST: CPT

## 2018-10-05 RX ORDER — ONDANSETRON 2 MG/ML
4 INJECTION INTRAMUSCULAR; INTRAVENOUS
Status: COMPLETED | OUTPATIENT
Start: 2018-10-05 | End: 2018-10-05

## 2018-10-05 RX ORDER — KETOROLAC TROMETHAMINE 15 MG/ML
15 INJECTION, SOLUTION INTRAMUSCULAR; INTRAVENOUS
Status: COMPLETED | OUTPATIENT
Start: 2018-10-05 | End: 2018-10-05

## 2018-10-05 RX ADMIN — SODIUM CHLORIDE 1000 ML: 900 INJECTION, SOLUTION INTRAVENOUS at 10:59

## 2018-10-05 RX ADMIN — KETOROLAC TROMETHAMINE 15 MG: 15 INJECTION, SOLUTION INTRAMUSCULAR; INTRAVENOUS at 10:59

## 2018-10-05 RX ADMIN — IOPAMIDOL 100 ML: 612 INJECTION, SOLUTION INTRAVENOUS at 12:40

## 2018-10-05 RX ADMIN — ONDANSETRON 4 MG: 2 INJECTION INTRAMUSCULAR; INTRAVENOUS at 10:59

## 2018-10-05 NOTE — DISCHARGE INSTRUCTIONS

## 2018-10-05 NOTE — ED PROVIDER NOTES
EMERGENCY DEPARTMENT HISTORY AND PHYSICAL EXAM 
 
Date: 10/5/2018 Patient Name: Nevaeh Brush History of Presenting Illness Chief Complaint Patient presents with  Abdominal Pain History Provided By: Patient Chief Complaint: Abd pain Duration: 3 Days Timing:  Worsening Location: RLQ and right flank Quality: Vale Reas Modifying Factors: Worse with movement and palpation Associated Symptoms: decreased appetite and SOB Additional History (Context):  
10:38 AM 
Nevaeh Brush is a 68 y.o. male with PMHX of arthritis, HTN, penile implant, and gastric sleeve who presents to the emergency department C/O sharp RLQ pain that radiates into his right flank x3 days. States that his pain began near his testicles. Associated sxs include decreased appetite and SOB. States that his pain is worsened with movement and palpation. Denies hx of kidney stones. Pt denies N/V, CP, hx of diabetes, and any other sxs or complaints. PCP: Rajwinder Parra MD 
 
Current Outpatient Prescriptions Medication Sig Dispense Refill  nefazodone (SERZONE) 250 mg tablet Take 250 mg by mouth two (2) times a day.  pravastatin (PRAVACHOL) 40 mg tablet Take 40 mg by mouth nightly.  cyclobenzaprine (FLEXERIL) 10 mg tablet Take 10 mg by mouth three (3) times daily as needed for Muscle Spasm(s).  cholecalciferol (VITAMIN D3) 1,000 unit cap Take  by mouth daily.  aspirin 81 mg chewable tablet Take 81 mg by mouth daily.  propranolol (INDERAL) 20 mg tablet Take 20 mg by mouth three (3) times daily.  melatonin 5 mg cap capsule Take 5 mg by mouth nightly.  glucosamine-chondroitin (ARTHX) 500-400 mg cap Take 1 Cap by mouth daily.  predniSONE (STERAPRED DS) 10 mg dose pack Use per pack instructions. 21 Tab 0 Past History Past Medical History: 
Past Medical History:  
Diagnosis Date  Arthritis  Gastrointestinal disorder Gastric sleeve  History of penile implant 2014  Hypertension Past Surgical History: 
Past Surgical History:  
Procedure Laterality Date  HX KNEE REPLACEMENT Right Family History: No family history on file. Social History: 
Social History Substance Use Topics  Smoking status: Never Smoker  Smokeless tobacco: Never Used  Alcohol use No  
 
 
Allergies: Allergies Allergen Reactions  Sulfa (Sulfonamide Antibiotics) Rash Review of Systems Review of Systems Constitutional: Negative for fever. Respiratory: Negative for shortness of breath. Cardiovascular: Negative for chest pain. Gastrointestinal: Positive for abdominal pain (RLQ). Negative for nausea and vomiting. Genitourinary: Positive for flank pain (Right). Neurological: Positive for dizziness. All other systems reviewed and are negative. Physical Exam  
 
Vitals:  
 10/05/18 1032 10/05/18 1345 BP: 131/81 118/79 Pulse: 70 (!) 54 Resp: 14 18 Temp: 97.6 °F (36.4 °C) 98.5 °F (36.9 °C) SpO2: 100% 100% Weight: 108.9 kg (240 lb) Height: 5' 8\" (1.727 m) Physical Exam 
Constitutional: Elderly  male who appears moderately uncomfortable while groaning in pain with movement Head: Normocephalic, Atraumatic Eyes: Pupils are equal, round, and reactive to light, EOMI, no scleral icterus, no conjunctival pallor ENT: Dry mucous membranes, hearing intact Neck: Supple, non-tender Cardiovascular: Regular rate and rhythm, no murmurs, rubs, or gallops Chest: Normal work of breathing and chest excursion bilaterally Lungs: Clear to ausculation bilaterally, no wheezes, no rhonchi Abdomen: Soft, tender over right flank and RLQ,  non distended, normoactive bowel sounds Back: No evidence of trauma or deformity, right sided CVA tenderness Extremities: No evidence of trauma or deformity, no LE edema Skin: Warm and dry, normal cap refill Neuro: Alert and appropriate, CN intact, normal speech, moving all 4 extremities freely and symmetrically Psychiatric: Cooperative, appropriate mood Diagnostic Study Results Labs - Recent Results (from the past 12 hour(s)) CBC WITH AUTOMATED DIFF Collection Time: 10/05/18 11:00 AM  
Result Value Ref Range WBC 6.1 4.6 - 13.2 K/uL  
 RBC 4.94 4.70 - 5.50 M/uL  
 HGB 12.4 (L) 13.0 - 16.0 g/dL HCT 39.7 36.0 - 48.0 % MCV 80.4 74.0 - 97.0 FL  
 MCH 25.1 24.0 - 34.0 PG  
 MCHC 31.2 31.0 - 37.0 g/dL  
 RDW 14.6 (H) 11.6 - 14.5 % PLATELET 730 507 - 624 K/uL MPV 9.3 9.2 - 11.8 FL  
 NEUTROPHILS 51 40 - 73 % LYMPHOCYTES 40 21 - 52 % MONOCYTES 6 3 - 10 % EOSINOPHILS 3 0 - 5 % BASOPHILS 0 0 - 2 %  
 ABS. NEUTROPHILS 3.1 1.8 - 8.0 K/UL  
 ABS. LYMPHOCYTES 2.4 0.9 - 3.6 K/UL  
 ABS. MONOCYTES 0.4 0.05 - 1.2 K/UL  
 ABS. EOSINOPHILS 0.2 0.0 - 0.4 K/UL  
 ABS. BASOPHILS 0.0 0.0 - 0.1 K/UL  
 DF AUTOMATED URINALYSIS W/ RFLX MICROSCOPIC Collection Time: 10/05/18 11:50 AM  
Result Value Ref Range Color YELLOW Appearance CLEAR Specific gravity 1.020 1.005 - 1.030    
 pH (UA) 7.5 5.0 - 8.0 Protein NEGATIVE  NEG mg/dL Glucose NEGATIVE  NEG mg/dL Ketone NEGATIVE  NEG mg/dL Bilirubin NEGATIVE  NEG Blood NEGATIVE  NEG Urobilinogen 1.0 0.2 - 1.0 EU/dL Nitrites NEGATIVE  NEG Leukocyte Esterase TRACE (A) NEG MAGNESIUM Collection Time: 10/05/18 11:50 AM  
Result Value Ref Range Magnesium 2.0 1.6 - 2.6 mg/dL METABOLIC PANEL, COMPREHENSIVE Collection Time: 10/05/18 11:50 AM  
Result Value Ref Range Sodium 140 136 - 145 mmol/L Potassium 4.1 3.5 - 5.5 mmol/L Chloride 107 100 - 108 mmol/L  
 CO2 26 21 - 32 mmol/L Anion gap 7 3.0 - 18 mmol/L Glucose 92 74 - 99 mg/dL BUN 10 7.0 - 18 MG/DL Creatinine 0.90 0.6 - 1.3 MG/DL  
 BUN/Creatinine ratio 11 (L) 12 - 20 GFR est AA >60 >60 ml/min/1.73m2 GFR est non-AA >60 >60 ml/min/1.73m2  Calcium 8.1 (L) 8.5 - 10.1 MG/DL  
 Bilirubin, total 0.4 0.2 - 1.0 MG/DL  
 ALT (SGPT) 19 16 - 61 U/L  
 AST (SGOT) 17 15 - 37 U/L Alk. phosphatase 53 45 - 117 U/L Protein, total 6.5 6.4 - 8.2 g/dL Albumin 3.2 (L) 3.4 - 5.0 g/dL Globulin 3.3 2.0 - 4.0 g/dL A-G Ratio 1.0 0.8 - 1.7 URINE MICROSCOPIC ONLY Collection Time: 10/05/18 11:50 AM  
Result Value Ref Range WBC 0 to 3 0 - 5 /hpf  
 RBC 0 to 3 0 - 5 /hpf Epithelial cells 1+ 0 - 5 /lpf Bacteria FEW (A) NEG /hpf  
EKG, 12 LEAD, INITIAL Collection Time: 10/05/18  1:42 PM  
Result Value Ref Range Ventricular Rate 54 BPM  
 Atrial Rate 54 BPM  
 P-R Interval 190 ms QRS Duration 104 ms Q-T Interval 458 ms QTC Calculation (Bezet) 434 ms Calculated P Axis 72 degrees Calculated R Axis -3 degrees Calculated T Axis 25 degrees Diagnosis Sinus bradycardia Otherwise normal ECG No previous ECGs available Radiologic Studies -  
CT ABD PELV W CONT Final Result IMPRESSION: 
 
1.  No acute intra-abdominal or pelvic abnormality. 2. Normal caliber small and large intestine, to include a normal appendix. 3. Scattered colonic diverticula without findings of diverticulitis. 4. Sleeve gastrectomy. As read by the radiologist.  
 
CT Results  (Last 48 hours) 10/05/18 1252  CT ABD PELV W CONT Final result Impression:  IMPRESSION:  
   
1. No acute intra-abdominal or pelvic abnormality. 2. Normal caliber small and large intestine, to include a normal appendix. 3. Scattered colonic diverticula without findings of diverticulitis. 4. Sleeve gastrectomy. Narrative:  EXAM: CT of the abdomen and pelvis INDICATION: 68year-old patient with right lower quadrant abdominal pain. COMPARISON: None. TECHNIQUE: Axial CT imaging of the abdomen and pelvis was performed without oral  
and with intravenous contrast. Multiplanar reformats were generated. One or more dose reduction techniques were used on this CT: automated exposure  
control, adjustment of the mAs and/or kVp according to patient's size, and  
iterative reconstruction techniques. The specific techniques utilized on this CT  
exam have been documented in the patient's electronic medical record.   
   
_______________ FINDINGS:  
   
LOWER CHEST: Minor atelectasis is present at the lung bases. No pneumonic  
opacity or pleural effusion. Normal cardiac size. No pericardial effusion. LIVER, BILIARY: Hepatic enhancement is uniform. Several bilateral hepatic  
hypodensities are noted, too small to further characterize, statistically  
favored to represent small cysts. Multiple punctate granulomata present. No  
biliary dilation. Gallbladder is unremarkable. PANCREAS: Normal.  
   
SPLEEN: Multiple splenic granulomata present. ADRENALS: Normal.  
   
KIDNEYS/URETERS/BLADDER: No hydronephrosis or nephrolithiasis. No distal  
ureteral or bladder stone. Urinary bladder is decompressed and normal in CT  
appearance. PELVIC ORGANS: Partial visualization of a penile prosthesis and right-sided  
extraperitoneal reservoir. Small fat-containing umbilical hernia is noted  
bilaterally. VASCULATURE: Minor aortobiiliac atherosclerotic calcification is present without  
evidence of aneurysmal dilatation. LYMPH NODES: There are scattered subcentimeter mesenteric and retroperitoneal  
lymph nodes present without evidence of abdominal or pelvic adenopathy. GASTROINTESTINAL TRACT: Postop changes from gastric sleeve surgical procedure  
noted. Normal caliber small and large intestine. No bowel obstruction. No free  
intraperitoneal gas. Normal appendix. Scattered colonic diverticula are noted  
without evidence of diverticulitis. BONES: No acute or aggressive osseous abnormalities identified. Lower lumbar spondylosis and facet joint osteoarthrosis most pronounced L4-S1.  
   
OTHER: None.  
   
_______________ CXR Results  (Last 48 hours) None Medications given in the ED- Medications  
sodium chloride 0.9 % bolus infusion 1,000 mL (0 mL IntraVENous IV Completed 10/5/18 1159)  
ketorolac (TORADOL) injection 15 mg (15 mg IntraVENous Given 10/5/18 1059) ondansetron (ZOFRAN) injection 4 mg (4 mg IntraVENous Given 10/5/18 1059) iopamidol (ISOVUE 300) 61 % contrast injection 100 mL (100 mL IntraVENous Given 10/5/18 1240) Medical Decision Making I am the first provider for this patient. I reviewed the vital signs, available nursing notes, past medical history, past surgical history, family history and social history. Vital Signs-Reviewed the patient's vital signs. Pulse Oximetry Analysis - 100% on room air EKG interpretation: (Preliminary) 1:42 PM  
54 bpm. Sinus bradycardia. QT/QTc at 458/434 ms. No CECILIO. 
EKG read by Antonio Ortiz DO at 1:44 PM 
 
Records Reviewed: Nursing Notes and Old Medical Records Provider Notes (Medical Decision Making): 69 y/o male who presents with 3 days of rt flnak pain. On exam, he rogers mod uncomf, pain worse with movement w/ right flank and right CVA tenderness. My clini keith that this is likely kidney stones and thus will ob UA and screening lab work. Will also obtain CT scan with contrst to eval for other intraabd pathology. Will give Zofran and Toradol for sxs relief. Procedures: 
Procedures ED Course:  
10:38 AM Initial assessment performed. The patients presenting problems have been discussed, and they are in agreement with the care plan formulated and outlined with them. I have encouraged them to ask questions as they arise throughout their visit. 1:45 PM Labwork and UA reassuring. CT NAD with scattered diverticuli but no diverticulitis. On reassessment patient laying comfortably.  He reports feeling much better. D/C with strict return precautions. Diagnosis and Disposition DISCHARGE NOTE: 
1:45 PM 
Al Serrano  results have been reviewed with him. He has been counseled regarding his diagnosis, treatment, and plan. He verbally conveys understanding and agreement of the signs, symptoms, diagnosis, treatment and prognosis and additionally agrees to follow up as discussed. He also agrees with the care-plan and conveys that all of his questions have been answered. I have also provided discharge instructions for him that include: educational information regarding their diagnosis and treatment, and list of reasons why they would want to return to the ED prior to their follow-up appointment, should his condition change. He has been provided with education for proper emergency department utilization. CLINICAL IMPRESSION: 
 
1. Right flank pain 2. Musculoskeletal pain PLAN: 
1. D/C Home 2. Current Discharge Medication List  
  
 
3. Follow-up Information Follow up With Details Comments Contact Hang Hazel MD Schedule an appointment as soon as possible for a visit in 2 days For PCP follow up [address] [address] 
[phone] [facility] EMERGENCY DEPT Go to As needed, If symptoms worsen [address] [facility] [id number] 
[phone]  
  
 
_______________________________ Attestations: This note is prepared by Jenny Kohli, acting as Scribe for Jose Roberto Contreras DO. Jose Roberto Contreras DO:  The scribe's documentation has been prepared under my direction and personally reviewed by me in its entirety. I confirm that the note above accurately reflects all work, treatment, procedures, and medical decision making performed by me. 
_______________________________

## 2018-10-05 NOTE — ED NOTES
Pt hourly rounding competed. Safety 
 Pt (**)resting on stretcher with side rails up and call bell in reach. () in chair 
  () in parents arms. Toileting Pt offered ()Bedpan 
   (**)Assistance to Restroom 
   ()Urinal 
Ongoing Updates Updated on plan of care and status of test results. Pain Management Inquired as to comfort and offered comfort measures: 
  (**) warm blankets (**) dimmed lights

## 2018-10-05 NOTE — ED TRIAGE NOTES
Pt ambulated into er with complaints of lower right abdominal pain x 3 days describe pain as sharp pain worse with movement. Pt went to see Dr. Selena Garnett and was told to come to the ER.

## 2018-11-24 LAB
ATRIAL RATE: 54 BPM
CALCULATED P AXIS, ECG09: 72 DEGREES
CALCULATED R AXIS, ECG10: -3 DEGREES
CALCULATED T AXIS, ECG11: 25 DEGREES
DIAGNOSIS, 93000: NORMAL
P-R INTERVAL, ECG05: 190 MS
Q-T INTERVAL, ECG07: 458 MS
QRS DURATION, ECG06: 104 MS
QTC CALCULATION (BEZET), ECG08: 434 MS
VENTRICULAR RATE, ECG03: 54 BPM

## 2020-01-24 RX ORDER — ATROPINE SULFATE 0.1 MG/ML
0.5 INJECTION INTRAVENOUS
Status: CANCELLED | OUTPATIENT
Start: 2020-01-24 | End: 2020-01-25

## 2020-01-24 RX ORDER — DIPHENHYDRAMINE HYDROCHLORIDE 50 MG/ML
50 INJECTION, SOLUTION INTRAMUSCULAR; INTRAVENOUS ONCE
Status: CANCELLED | OUTPATIENT
Start: 2020-01-24 | End: 2020-01-24

## 2020-01-24 RX ORDER — EPINEPHRINE 0.1 MG/ML
1 INJECTION INTRACARDIAC; INTRAVENOUS
Status: CANCELLED | OUTPATIENT
Start: 2020-01-24 | End: 2020-01-25

## 2020-01-24 RX ORDER — SODIUM CHLORIDE 0.9 % (FLUSH) 0.9 %
5-40 SYRINGE (ML) INJECTION EVERY 8 HOURS
Status: CANCELLED | OUTPATIENT
Start: 2020-01-24

## 2020-01-24 RX ORDER — DEXTROMETHORPHAN/PSEUDOEPHED 2.5-7.5/.8
1.2 DROPS ORAL
Status: CANCELLED | OUTPATIENT
Start: 2020-01-24

## 2020-01-24 RX ORDER — SODIUM CHLORIDE 0.9 % (FLUSH) 0.9 %
5-40 SYRINGE (ML) INJECTION AS NEEDED
Status: CANCELLED | OUTPATIENT
Start: 2020-01-24

## 2020-01-27 ENCOUNTER — HOSPITAL ENCOUNTER (OUTPATIENT)
Age: 75
Setting detail: OUTPATIENT SURGERY
Discharge: HOME | End: 2020-01-27
Attending: INTERNAL MEDICINE | Admitting: INTERNAL MEDICINE
Payer: MEDICARE

## 2020-01-27 VITALS
OXYGEN SATURATION: 96 % | WEIGHT: 249.9 LBS | SYSTOLIC BLOOD PRESSURE: 128 MMHG | HEART RATE: 60 BPM | RESPIRATION RATE: 16 BRPM | BODY MASS INDEX: 37.87 KG/M2 | TEMPERATURE: 97.8 F | DIASTOLIC BLOOD PRESSURE: 86 MMHG | HEIGHT: 68 IN

## 2020-01-27 PROCEDURE — G0500 MOD SEDAT ENDO SERVICE >5YRS: HCPCS | Performed by: INTERNAL MEDICINE

## 2020-01-27 PROCEDURE — 77030040361 HC SLV COMPR DVT MDII -B: Performed by: INTERNAL MEDICINE

## 2020-01-27 PROCEDURE — 74011250636 HC RX REV CODE- 250/636: Performed by: INTERNAL MEDICINE

## 2020-01-27 PROCEDURE — 76040000007: Performed by: INTERNAL MEDICINE

## 2020-01-27 PROCEDURE — 99153 MOD SED SAME PHYS/QHP EA: CPT | Performed by: INTERNAL MEDICINE

## 2020-01-27 RX ORDER — MIDAZOLAM HYDROCHLORIDE 1 MG/ML
.25-5 INJECTION, SOLUTION INTRAMUSCULAR; INTRAVENOUS
Status: DISCONTINUED | OUTPATIENT
Start: 2020-01-27 | End: 2020-01-27 | Stop reason: HOSPADM

## 2020-01-27 RX ORDER — FLUMAZENIL 0.1 MG/ML
0.2 INJECTION INTRAVENOUS
Status: DISCONTINUED | OUTPATIENT
Start: 2020-01-27 | End: 2020-01-27 | Stop reason: HOSPADM

## 2020-01-27 RX ORDER — FENTANYL CITRATE 50 UG/ML
100 INJECTION, SOLUTION INTRAMUSCULAR; INTRAVENOUS
Status: DISCONTINUED | OUTPATIENT
Start: 2020-01-27 | End: 2020-01-27 | Stop reason: HOSPADM

## 2020-01-27 RX ORDER — NALOXONE HYDROCHLORIDE 0.4 MG/ML
0.4 INJECTION, SOLUTION INTRAMUSCULAR; INTRAVENOUS; SUBCUTANEOUS
Status: DISCONTINUED | OUTPATIENT
Start: 2020-01-27 | End: 2020-01-27 | Stop reason: HOSPADM

## 2020-01-27 RX ORDER — SODIUM CHLORIDE 9 MG/ML
1000 INJECTION, SOLUTION INTRAVENOUS CONTINUOUS
Status: DISCONTINUED | OUTPATIENT
Start: 2020-01-27 | End: 2020-01-27 | Stop reason: HOSPADM

## 2020-01-27 RX ORDER — PROPRANOLOL HYDROCHLORIDE 60 MG/1
60 CAPSULE, EXTENDED RELEASE ORAL DAILY
COMMUNITY

## 2020-01-27 RX ADMIN — SODIUM CHLORIDE 1000 ML: 900 INJECTION, SOLUTION INTRAVENOUS at 11:42

## 2020-01-27 NOTE — PROCEDURES
Esophagogastroduodenoscopy Procedure Note      Eulogio Hauser  1945  [id number]    Indication: Patient of Dr. Quang Mata  referred  for dysphagia. Onset approx 2 months ago. Progressive Dysphagia is to solids. Episodes occur with each meal and resolved with both regurgitation and continuing swallowing. Pt reports chronic chest pain. No weight loss and patient is cautious when eating. No nausea or vomiting. Pt reports mild chronic rare GERD and takes Tagamet  daily with significant relief. Barium swallow as of 12/04/2019 revealed esophageal dysmotility, diverticulum of distal esophagus, small hiatal hernia. Medical hx includes depression, HTN, hyperlipidemia. Surgical hx remarkable for right knee replacement, gastric sleeve,    : Montana Reyez MD    Referring Provider:  Rigo Gil MD    Sedation:   Versed 5 and Fentanyl 100 mcg IV      Procedure Details:  After infomed consent was obtained for the procedure, with all risks and benefits of procedure explained the patient was taken to the endoscopy suite and placed in the left lateral decubitus position. Following sequential administration of sedation as per above, the endoscope was inserted into the mouth and advanced under direct vision to proximal jejunum. A careful inspection was made as the gastroscope was withdrawn, including a retroflexed view of the proximal stomach; findings and interventions are described below. OROPHARYNX: Cords and pyriform recesses normal.   ESOPHAGUS: The proximal and mid esophagus are normal. Distal esophageal motility disorder with some spasms and one single tiny esophagea diverticulum. Mild distal esophageal stenosis from scarring. Moderate distal esophageal ring at the EG junction at 38 cm permitting the passage of the gastroscope. 3 cm hiatal hernia between 38 to 41 - 43 cm. Esophageal dilatation is made with Savary bougies # 38, 42 and 39 Fr which passed without great resistance or obvious trauma . STOMACH:  Sp adequate sleeve gastrectomy with normal mucosa. DUODENUM: The bulb, second, third, forth portions proximal jejunum and major papilla are normal.  Therapies:  esophageal dilation with Savary bougies sizes 38, 42 and 45 Fr    Specimen: none           Complications:   None    EBL:  None. IMPRESSION:  Distal esophageal motility disorder with some spasms and one single tiny esophagea diverticulum. Mild distal esophageal stenosis from scarring. Moderate distal esophageal ring at the EG-junction at 38 cm permitting the passage of the gastroscope. 3 cm hiatal hernia between 38 to 41 - 43 cm. Sp adequate sleeve gastrectomy. Esophageal dilatation is made with Savary bougies # 38, 42 and 45 Fr which passed without great resistance or obvious trauma   Recommendations: -Acid suppression with a proton pump inhibitor such as Omeprazole 20 mg daily for at least 3 months then as needed. , -Follow symptoms. , -chew well food and avoid dense meat. GERD diet: avoid fried and fatty foods.  peppermint, chocolate, alcohol, coffee, citrus fruits and juices, tomoato products; avoid lying down for 2 to 3 hours after eating., -Follow up with me., -No NSAID'S    Selma August MD  1/27/2020  12:02 PM

## 2020-01-27 NOTE — H&P
Assessment/Plan  # Detail Type Description    1. Assessment Difficulty in swallowing NOS (R13.10). Patient Plan  69year old patient of Dr. Jaquan Cardoza  referred  for dysphagia. Onset approx 2 months ago. Dysphagia is to solids and liquids. Episodes occur with each meal and resolved with both regurgitation and continuing swallowing. Pt reports chronic chest pain. No weight loss and patient is cautious when eating. No nausea or vomiting. Pt reports chronic GERD and takes Tagamet  daily with significant relief. Barium swallow as of 12/04/2019 revealed esophageal dysmotility, diverticulum of distal esophagus, small hiatal hernia. Patient reports sulfa allergies but no herbal consumption. Medical hx includes depression, HTN, hyperlipidemia. No significant cardiac, pulmonary, GI, , renal, hepatic, musculoskeletal, or endocrine issues. Surgical hx remarkable for right knee replacement, gastric sleeve, penile implant. Denies tobacco and ETOH use. No significant weight gains or losses in the last 3-6 months. No heat or cold intolerances. Patient states  no N/V/D, fever, chills, sick contacts, SOB, abdominal or chest pains. No dysphagia, appetite is good which consists of 3 meals per day. PLAN: EGD scheduled Pt advised to drink prior to eating and in between bites of food, chew foods to soft consistency. Avoid NSAID's, tobacco and ETOH consumption. I explained the procedure of upper endoscopy or EGD, the alternative and the risks involved which include but not limited to aspiration, bleeding perforation or reaction to sedation. Patient was agreeable to this. 2. Assessment Diarrhea (R19.7). Patient Plan 69year old male referred by Dr. Brent Cain seen for diarrhea. Onset approx a month ago. BM are 3-4 stools a day that are described as #6-7 on [place]atte stool scale. Stools occur after meals. No fecal incontinence. No blood or mucus. No abdominal pain, fevers or chills, nausea, or vomiting.      Pt has not tried any treatments for relief. No tobacco or ETOH use. consumes 2 cups of coffee per daily, and consumes moderate dairy. PLAN: Imodium as needed. R/O infectious etiology. C-Diff, Ova & Parasite, Stool Cx, Celiac, WBC Stool. Plan Orders C difficile Toxins A+B, EIA to be performed. , Celiac Disease Panel to be performed., Giardia, EIA; Ova/Parasite to be performed. , Stool Culture to be performed. and White Blood Cells (WBC), Stool to be performed. This 69year old male presents for Abnormal Barium Swallow, Dyspepsia and Diarrhea. History of Present Illness:  1. Abnormal Barium Swallow   2. Dyspepsia   Onset: 1 month ago. Severity level: severe. It occurs daily. The problem is with no change. Symptom is aggravated by food. Relieving factors include antacids. Pertinent negatives include, abdominal distention, anorexia, bloating, dysphagia, early satiety, epigastric pain, GI bleeding, heartburn, milk/dairy intolerance, postprandial fullness, vomiting and weight loss. 3.  Diarrhea   Onset: 1 month ago. The patient describes it as loose and brown. It occurs constantly. He denies aggravating factors. He denies relieving factors. Associated symptoms include flatulence. Pertinent negatives include abdominal pain, anorexia, bloating, blood in stool, change in appetite, cramping (abdominal), decreased urine output, distention (abdominal), fecal incontinence, fever, joint pain, nausea, rash, tenesmus, vomiting and weight loss.           PROBLEM LIST:     Problem Description Onset Date Chronic Clinical Status Notes   Esophageal diverticulum 12/05/2019 Y     Low back pain 09/28/2017 Y     Obesity 03/22/2016 Y     Insomnia 09/28/2017 Y     Anemia 09/20/2018 N     BPH w/ lower urinary tract symptom 03/21/2019 Y     OA - Osteoarthritis of knee 06/01/2016 Y     Erectile dysfunction 06/01/2016 Y     History of bariatric surgery 06/01/2016 Y     BPH - Benign prostatic hypertrophy 06/01/2016 Y Essential tremor 02/10/2017 Y     Colonoscopy refused 2016 Y     Chronic depression 2016 Y     Mixed hyperlipidemia 2016 Y     H/O: alcoholism 2016 Y     Hiatal hernia 2019 Y     Esophageal dysmotility 2019 Y     Restless legs  N               PAST MEDICAL/SURGICAL HISTORY   (Detailed)    Disease/disorder Onset Date Management Date Comments     Penile implant  [facility] 2016 -     Gastric sleeve  [facility] 2016 -     Knee replacement  [facility] 2016 -   BPH  TURP  [facility] 2016 -         Family History  (Detailed)  Relationship Family Member Name  Age at Death Condition Onset Age Cause of Death       No family history of Hypertension  N       No family history of Renal disease  N       No family history of Asthma  N       No family history of Cancer, prostate  N       Family history of Anxiety  N       No family history of Cancer, colon  N       No family history of Coronary artery disease  N       No family history of Liver disease  N       No family history of Anxiety  N       No family history of Stroke  N       No family history of Depression  N       No family history of Diabetes mellitus type 2  N       Family history of Depression  N       No family history of Hyperlipidemia  N   Brother    Alcoholism  N   Brother    Hypertension  N   Brother    Lymphoma 46 Y   Father    Cancer, lung  Y   Mother    Rheumatoid arthritis  N   Mother    Alzheimer's disease  N         Social History:  (Detailed)  The patient is right-handed. Preferred language is [place]. Born in [facility]. EDUCATION/EMPLOYMENT/OCCUPATION  The patient has a(n) graduate school education. Earned a Master's degree. Employment History Status Retired Restrictions   [employer]  retired       MARITAL STATUS/FAMILY/SOCIAL SUPPORT  Marital status:    CHILDREN  Has children:  Smoking status: Never smoker. ALCOHOL  There is no history of alcohol use.    CAFFEINE  The patient uses caffeine: coffee - 2 cups a day. Holiness/SPIRITUAL  The patient has [facility] [employer] affiliation. Medications (active prior to today)  Medication Name Sig Description Start Date Stop Date Refilled Rx Elsewhere   Centrum Silver 0.4 mg-300 mcg-250 mcg tablet take 1 Tablet by Oral route  every day //   Y   Tylenol Extra Strength 500 mg tablet take 2 tablet by oral route  every 6 hours as needed //  03/22/2018 Y   Smita Aspirin 325 mg tablet  09/20/2018   N   Inderal LA 80 mg capsule,extended release take 1 capsule by oral route  every day 03/13/2019 03/13/2019 N   gabapentin 100 mg capsule 1 cap po daily for RLS 03/13/2019 03/13/2019 N   nefazodone 250 mg tablet take 1/2 tablet by oral route 2 times every day 09/20/2019 09/20/2019 N   PRAVASTATIN TABS 40MG TAKE 1 TABLET DAILY EVERY EVENING 10/28/2019  10/28/2019 N   Tagamet  mg tablet take 1 tablet by oral route 2 times every day 30 minutes before meals 11/15/2019   N   nystatin 100,000 unit/gram topical cream apply by topical route 2 times every day to the affected area(s) for up to 3-4 weeks until resolved 11/15/2019   N     Patient Status   Completed with information received for patient in a summary of care record.        Medications (Added, Continued or Stopped today)  Start Date Medication Directions PRN Status PRN Reason Instruction Stop Date   09/20/2018 Smita Aspirin 325 mg tablet  N       Centrum Silver 0.4 mg-300 mcg-250 mcg tablet take 1 Tablet by Oral route  every day N      03/13/2019 gabapentin 100 mg capsule 1 cap po daily for RLS N      03/13/2019 Inderal LA 80 mg capsule,extended release take 1 capsule by oral route  every day N      09/20/2019 nefazodone 250 mg tablet take 1/2 tablet by oral route 2 times every day N      11/15/2019 nystatin 100,000 unit/gram topical cream apply by topical route 2 times every day to the affected area(s) for up to 3-4 weeks until resolved N      10/28/2019 PRAVASTATIN TABS 40MG TAKE 1 TABLET DAILY EVERY EVENING N      11/15/2019 Tagamet  mg tablet take 1 tablet by oral route 2 times every day 30 minutes before meals N       Tylenol Extra Strength 500 mg tablet take 2 tablet by oral route  every 6 hours as needed N        Allergies:  Ingredient Reaction (Severity) Medication Name Comment   SULFA (SULFONAMIDE ANTIBIOTICS)          ORDERS:  Status Lab Order Time Frame Comments   ordered C difficile Toxins A+B, EIA     ordered Celiac Disease Panel     ordered Giardia, EIA; Ova/Parasite     ordered White Blood Cells (WBC), Stool     ordered Stool Culture       Review of System  System Neg/Pos Details   Constitutional Negative Chills, Fever, Malaise and Weight loss. ENMT Negative Ear infections, Nasal congestion, Sinus Infection and Sore throat. Eyes Negative Double vision and Eye pain. Respiratory Negative Asthma, Chronic cough, Dyspnea, Pleuritic pain and Wheezing. Cardio Negative Chest pain, Edema and Irregular heartbeat/palpitations. GI Positive Flatulence. GI Negative Abdominal cramping, Abdominal distention, Abdominal pain, Anorexia, Bloating, Blood in stool, Change in appetite, Change in bowel habits, Constipation, Decreased appetite, Diarrhea, Dysphagia, Early satiety, Epigastric pain, Fecal incontinence, Gastrointestinal bleeding, Heartburn, Hematemesis, Hematochezia, Melena, Milk/diary intolerance, Nausea, Post prandial fullness, Reflux, Tenesmus and Vomiting.  Negative Decreased urine output, Dysuria, Hematuria, Urinary frequency, Urinary incontinence and Urinary retention. Endocrine Negative Cold intolerance, Gynecomastia, Heat intolerance and Increased thirst.   Neuro Negative Dizziness, Headache, Numbness, Tremors and Vertigo. Psych Negative Anxiety, Depression and Increased stress. Integumentary Negative Hives, Pruritus and Rash. MS Negative Back pain, Joint pain and Myalgia. Hema/Lymph Negative Easy bleeding, Easy bruising and Lymphadenopathy. Allergic/Immuno Negative Chemicals in work place, Contact allergy, Food allergies, Immunosuppression and Seasonal allergies. Reproductive Negative Penile discharge and Sexual dysfunction. Vital Signs   Height  Time ft in cm Last Measured Height Position   3:04 PM 5.0 8.00 172.72 01/23/2020 Standing     MAP (Calculated) Arterial Line 1 BP (mmHg) BP Patient Position Resp SpO2 O2 Device O2 Flow Rate (L/min) Pre/Post Ductal Weight       01/27/20 1126 97.4 °F (36.3 °C) 62 127/90 102   16 93 % Room air   113.4 kg (249 lb 14.4 oz)       PHYSICAL EXAM:  Exam Findings Details   Constitutional Normal Well developed. Eyes Normal Conjunctiva - Right: Normal, Left: Normal. Sclera - Right: Normal, Left: Normal.   Nasopharynx Normal Lips/teeth/gums - Normal. Buccal mucosa - Normal.   Neck Exam Normal Inspection - Normal. Palpation - Normal. Thyroid gland - Normal.   Respiratory Normal Inspection - Normal. Auscultation - Normal.   Cardiovascular Normal Regular rate and rhythm. No murmurs, gallops, or rubs. Vascular Normal Pulses - Radial: Normal, Brachial: Normal, Dorsalis pedis: Normal, Posterior tibial: Normal.   Abdomen Normal Inspection - Normal. Appliance(s) - None. Abdominal muscles - Normal. Auscultation - Normal. Percussion - Normal. Anterior palpation - Normal, No guarding. Umbilicus - Normal. No abdominal tenderness. No hepatic enlargement. No spleen enlargement. No hernia. No ascites. Roa's sign - Negative. No hepatic tenderness. No hepatic bruit. Skin Normal Inspection - Normal.   Musculoskeletal Normal Hands/Wrist - Right: Normal, Left: Normal.   Extremity Normal No edema. Neurological Normal Fine motor skills - Normal.   Psychiatric Normal Orientation - Oriented to time, place, person & situation. Appropriate mood and affect.        No change in H&P

## 2020-01-27 NOTE — DISCHARGE INSTRUCTIONS
Gilmer Martinez  [id number]  1945    EGD DISCHARGE INSTRUCTIONS  Discomfort:  Sore throat- throat lozenges or warm salt water gargle  redness at IV site- apply warm compress to area; if redness or soreness persist- contact your physician  Gaseous discomfort- walking, belching will help relieve any discomfort  You may not operate a vehicle until the next day  You may not engage in an occupation involving machinery or appliances until the next day  You may not drink alcoholic beverages until the next day  Avoid making any critical decisions for at least 24 hour    DIET   You may not resume your regular diet. Antireflux diet. ACTIVITY  You may not resume your normal daily activities   Spend the remainder of the day resting -  avoid any strenuous activity. CALL M.D. ANY SIGN OF   Increasing pain, nausea, vomiting  Abdominal distension (swelling)  New increased bleeding (oral or rectal)  Fever (chills)  Pain in chest area  Bloody discharge from nose or mouth  Shortness of breath     You may ever take any Advil, Aspirin, Ibuprofen, Motrin, Aleve, or Goodys  ONLY  Tylenol as needed for pain. Follow-up Instructions: Follow-up in the office as scheduled or make a follow-up appointment in 2 weeks. Nikki Fung MD  January 27, 2020      DISCHARGE SUMMARY from Nurse    PATIENT INSTRUCTIONS:    After general anesthesia or intravenous sedation, for 24 hours or while taking prescription Narcotics:  · Limit your activities  · Do not drive and operate hazardous machinery  · Do not make important personal or business decisions  · Do  not drink alcoholic beverages  · If you have not urinated within 8 hours after discharge, please contact your surgeon on call.     Report the following to your surgeon:  · Excessive pain, swelling, redness or odor of or around the surgical area  · Temperature over 100.5  · Nausea and vomiting lasting longer than 4 hours or if unable to take medications  · Any signs of decreased circulation or nerve impairment to extremity: change in color, persistent  numbness, tingling, coldness or increase pain  · Any questions    What to do at Home:  Recommended activity: as above,     If you experience any of the following symptoms as above, please follow up with Dr. Monika Noyola. *  Please give a list of your current medications to your Primary Care Provider. *  Please update this list whenever your medications are discontinued, doses are      changed, or new medications (including over-the-counter products) are added. *  Please carry medication information at all times in case of emergency situations. These are general instructions for a healthy lifestyle:    No smoking/ No tobacco products/ Avoid exposure to second hand smoke  Surgeon General's Warning:  Quitting smoking now greatly reduces serious risk to your health. Obesity, smoking, and sedentary lifestyle greatly increases your risk for illness    A healthy diet, regular physical exercise & weight monitoring are important for maintaining a healthy lifestyle    You may be retaining fluid if you have a history of heart failure or if you experience any of the following symptoms:  Weight gain of 3 pounds or more overnight or 5 pounds in a week, increased swelling in our hands or feet or shortness of breath while lying flat in bed. Please call your doctor as soon as you notice any of these symptoms; do not wait until your next office visit. The discharge information has been reviewed with the patient. The patient verbalized understanding. Discharge medications reviewed with the patient and appropriate educational materials and side effects teaching were provided.   ___________________________________________________________________________________________________________________________________  Patient armband removed and shredded

## 2021-08-27 ENCOUNTER — APPOINTMENT (OUTPATIENT)
Dept: CT IMAGING | Age: 76
End: 2021-08-27
Attending: EMERGENCY MEDICINE
Payer: MEDICARE

## 2021-08-27 ENCOUNTER — HOSPITAL ENCOUNTER (EMERGENCY)
Age: 76
Discharge: HOME | End: 2021-08-27
Attending: EMERGENCY MEDICINE
Payer: MEDICARE

## 2021-08-27 ENCOUNTER — APPOINTMENT (OUTPATIENT)
Dept: GENERAL RADIOLOGY | Age: 76
End: 2021-08-27
Attending: EMERGENCY MEDICINE
Payer: MEDICARE

## 2021-08-27 VITALS
WEIGHT: 265 LBS | TEMPERATURE: 98 F | SYSTOLIC BLOOD PRESSURE: 125 MMHG | DIASTOLIC BLOOD PRESSURE: 93 MMHG | HEART RATE: 71 BPM | BODY MASS INDEX: 40.16 KG/M2 | HEIGHT: 68 IN | OXYGEN SATURATION: 96 % | RESPIRATION RATE: 17 BRPM
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
## 2021-08-27 DIAGNOSIS — S20.212A CONTUSION OF LEFT CHEST WALL, INITIAL ENCOUNTER: ICD-10-CM

## 2021-08-27 LAB
ANION GAP SERPL CALC-SCNC: 6 MMOL/L (ref 3–18)
BASOPHILS # BLD: 0 K/UL (ref 0–0.1)
BASOPHILS NFR BLD: 0 % (ref 0–2)
BUN SERPL-MCNC: 14 MG/DL (ref 7–18)
BUN/CREAT SERPL: 13 (ref 12–20)
CALCIUM SERPL-MCNC: 8.6 MG/DL (ref 8.5–10.1)
CHLORIDE SERPL-SCNC: 110 MMOL/L (ref 100–111)
CO2 SERPL-SCNC: 25 MMOL/L (ref 21–32)
CREAT SERPL-MCNC: 1.11 MG/DL (ref 0.6–1.3)
DIFFERENTIAL METHOD BLD: ABNORMAL
EOSINOPHIL # BLD: 0.3 K/UL (ref 0–0.4)
EOSINOPHIL NFR BLD: 4 % (ref 0–5)
ERYTHROCYTE [DISTWIDTH] IN BLOOD BY AUTOMATED COUNT: 17.2 % (ref 11.6–14.5)
GLUCOSE SERPL-MCNC: 150 MG/DL (ref 74–99)
HCT VFR BLD AUTO: 34.8 % (ref 36–48)
HGB BLD-MCNC: 10.4 G/DL (ref 13–16)
LYMPHOCYTES # BLD: 2.7 K/UL (ref 0.9–3.6)
LYMPHOCYTES NFR BLD: 37 % (ref 21–52)
MCH RBC QN AUTO: 21.9 PG (ref 24–34)
MCHC RBC AUTO-ENTMCNC: 29.9 G/DL (ref 31–37)
MCV RBC AUTO: 73.4 FL (ref 78–100)
MONOCYTES # BLD: 0.5 K/UL (ref 0.05–1.2)
MONOCYTES NFR BLD: 6 % (ref 3–10)
NEUTS SEG # BLD: 3.9 K/UL (ref 1.8–8)
NEUTS SEG NFR BLD: 52 % (ref 40–73)
PLATELET # BLD AUTO: 289 K/UL (ref 135–420)
PMV BLD AUTO: 9.5 FL (ref 9.2–11.8)
POTASSIUM SERPL-SCNC: 4.1 MMOL/L (ref 3.5–5.5)
RBC # BLD AUTO: 4.74 M/UL (ref 4.35–5.65)
SODIUM SERPL-SCNC: 141 MMOL/L (ref 136–145)
WBC # BLD AUTO: 7.4 K/UL (ref 4.6–13.2)

## 2021-08-27 PROCEDURE — 80048 BASIC METABOLIC PNL TOTAL CA: CPT

## 2021-08-27 PROCEDURE — 96374 THER/PROPH/DIAG INJ IV PUSH: CPT

## 2021-08-27 PROCEDURE — 85025 COMPLETE CBC W/AUTO DIFF WBC: CPT

## 2021-08-27 PROCEDURE — 74011000636 HC RX REV CODE- 636: Performed by: EMERGENCY MEDICINE

## 2021-08-27 PROCEDURE — 71260 CT THORAX DX C+: CPT

## 2021-08-27 PROCEDURE — 93005 ELECTROCARDIOGRAM TRACING: CPT

## 2021-08-27 PROCEDURE — 99284 EMERGENCY DEPT VISIT MOD MDM: CPT

## 2021-08-27 PROCEDURE — 74011250637 HC RX REV CODE- 250/637: Performed by: EMERGENCY MEDICINE

## 2021-08-27 PROCEDURE — 71045 X-RAY EXAM CHEST 1 VIEW: CPT

## 2021-08-27 RX ORDER — NAPROXEN 500 MG/1
500 TABLET ORAL 2 TIMES DAILY WITH MEALS
Qty: 14 TABLET | Refills: 0 | Status: SHIPPED | OUTPATIENT
Start: 2021-08-27 | End: 2021-09-03

## 2021-08-27 RX ORDER — HYDROCODONE BITARTRATE AND ACETAMINOPHEN 5; 325 MG/1; MG/1
1 TABLET ORAL ONCE
Status: COMPLETED | OUTPATIENT
Start: 2021-08-27 | End: 2021-08-27

## 2021-08-27 RX ADMIN — IOPAMIDOL 100 ML: 612 INJECTION, SOLUTION INTRAVENOUS at 19:08

## 2021-08-27 RX ADMIN — HYDROCODONE BITARTRATE AND ACETAMINOPHEN 1 TABLET: 5; 325 TABLET ORAL at 16:05

## 2021-08-27 NOTE — ED NOTES
Report received from FANN[facility], [address] [facility], assumed care of patient, patient taken by CT tech for scan, family at bedside
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
## 2021-08-28 LAB
ATRIAL RATE: 73 BPM
CALCULATED P AXIS, ECG09: 26 DEGREES
CALCULATED R AXIS, ECG10: 19 DEGREES
CALCULATED T AXIS, ECG11: 59 DEGREES
DIAGNOSIS, 93000: NORMAL
P-R INTERVAL, ECG05: 168 MS
Q-T INTERVAL, ECG07: 402 MS
QRS DURATION, ECG06: 94 MS
QTC CALCULATION (BEZET), ECG08: 442 MS
VENTRICULAR RATE, ECG03: 73 BPM

## 2021-08-28 NOTE — ED NOTES
Patient discharged home after explanation of discharge instructions, patient and wife vocalized understanding of instructions.

## 2025-01-20 ENCOUNTER — APPOINTMENT (OUTPATIENT)
Facility: HOSPITAL | Age: 80
End: 2025-01-20
Payer: MEDICARE

## 2025-01-20 ENCOUNTER — HOSPITAL ENCOUNTER (EMERGENCY)
Facility: HOSPITAL | Age: 80
Discharge: HOME | End: 2025-01-20
Attending: EMERGENCY MEDICINE
Payer: MEDICARE

## 2025-01-20 VITALS
BODY MASS INDEX: 36.98 KG/M2 | WEIGHT: 244 LBS | HEIGHT: 68 IN | DIASTOLIC BLOOD PRESSURE: 80 MMHG | RESPIRATION RATE: 19 BRPM | HEART RATE: 102 BPM | SYSTOLIC BLOOD PRESSURE: 134 MMHG | OXYGEN SATURATION: 96 % | TEMPERATURE: 97.7 F

## 2025-01-20 DIAGNOSIS — E86.0 DEHYDRATION: ICD-10-CM

## 2025-01-20 DIAGNOSIS — R00.0 TACHYCARDIA: Primary | ICD-10-CM

## 2025-01-20 LAB
ALBUMIN SERPL-MCNC: 3.4 G/DL (ref 3.4–5)
ALBUMIN/GLOB SERPL: 1.1 (ref 0.8–1.7)
ALP SERPL-CCNC: 82 U/L (ref 45–117)
ALT SERPL-CCNC: 26 U/L (ref 16–61)
ANION GAP SERPL CALC-SCNC: 6 MMOL/L (ref 3–18)
AST SERPL-CCNC: 23 U/L (ref 10–38)
BASOPHILS # BLD: 0.02 K/UL (ref 0–0.1)
BASOPHILS NFR BLD: 0.3 % (ref 0–2)
BILIRUB SERPL-MCNC: 0.4 MG/DL (ref 0.2–1)
BUN SERPL-MCNC: 12 MG/DL (ref 7–18)
BUN/CREAT SERPL: 13 (ref 12–20)
CALCIUM SERPL-MCNC: 9.3 MG/DL (ref 8.5–10.1)
CHLORIDE SERPL-SCNC: 107 MMOL/L (ref 100–111)
CO2 SERPL-SCNC: 28 MMOL/L (ref 21–32)
CREAT SERPL-MCNC: 0.89 MG/DL (ref 0.6–1.3)
DIFFERENTIAL METHOD BLD: NORMAL
EOSINOPHIL # BLD: 0.1 K/UL (ref 0–0.4)
EOSINOPHIL NFR BLD: 1.5 % (ref 0–5)
ERYTHROCYTE [DISTWIDTH] IN BLOOD BY AUTOMATED COUNT: 13.8 % (ref 11.6–14.5)
GLOBULIN SER CALC-MCNC: 3 G/DL (ref 2–4)
GLUCOSE SERPL-MCNC: 153 MG/DL (ref 74–99)
HCT VFR BLD AUTO: 46.6 % (ref 36–48)
HGB BLD-MCNC: 15.7 G/DL (ref 13–16)
IMM GRANULOCYTES # BLD AUTO: 0.03 K/UL (ref 0–0.04)
IMM GRANULOCYTES NFR BLD AUTO: 0.5 % (ref 0–0.5)
LYMPHOCYTES # BLD: 2.26 K/UL (ref 0.9–3.3)
LYMPHOCYTES NFR BLD: 34.7 % (ref 21–52)
MAGNESIUM SERPL-MCNC: 2 MG/DL (ref 1.6–2.6)
MCH RBC QN AUTO: 31.1 PG (ref 24–34)
MCHC RBC AUTO-ENTMCNC: 33.7 G/DL (ref 31–37)
MCV RBC AUTO: 92.3 FL (ref 78–100)
MONOCYTES # BLD: 0.49 K/UL (ref 0.05–1.2)
MONOCYTES NFR BLD: 7.5 % (ref 3–10)
NEUTS SEG # BLD: 3.62 K/UL (ref 1.8–8)
NEUTS SEG NFR BLD: 55.5 % (ref 40–73)
NRBC # BLD: 0 K/UL (ref 0–0.01)
NRBC BLD-RTO: 0 PER 100 WBC
PLATELET # BLD AUTO: 229 K/UL (ref 135–420)
PMV BLD AUTO: 9.3 FL (ref 9.2–11.8)
POTASSIUM SERPL-SCNC: 3.7 MMOL/L (ref 3.5–5.5)
PROT SERPL-MCNC: 6.4 G/DL (ref 6.4–8.2)
RBC # BLD AUTO: 5.05 M/UL (ref 4.35–5.65)
SODIUM SERPL-SCNC: 141 MMOL/L (ref 136–145)
TROPONIN I SERPL HS-MCNC: 18 NG/L (ref 0–78)
TROPONIN I SERPL HS-MCNC: 19 NG/L (ref 0–78)
WBC # BLD AUTO: 6.5 K/UL (ref 4.6–13.2)

## 2025-01-20 PROCEDURE — 99285 EMERGENCY DEPT VISIT HI MDM: CPT

## 2025-01-20 PROCEDURE — 80053 COMPREHEN METABOLIC PANEL: CPT

## 2025-01-20 PROCEDURE — 93005 ELECTROCARDIOGRAM TRACING: CPT | Performed by: EMERGENCY MEDICINE

## 2025-01-20 PROCEDURE — 71045 X-RAY EXAM CHEST 1 VIEW: CPT

## 2025-01-20 PROCEDURE — 84484 ASSAY OF TROPONIN QUANT: CPT

## 2025-01-20 PROCEDURE — 85025 COMPLETE CBC W/AUTO DIFF WBC: CPT

## 2025-01-20 PROCEDURE — 96361 HYDRATE IV INFUSION ADD-ON: CPT

## 2025-01-20 PROCEDURE — 96360 HYDRATION IV INFUSION INIT: CPT

## 2025-01-20 PROCEDURE — 83735 ASSAY OF MAGNESIUM: CPT

## 2025-01-20 PROCEDURE — 2580000003 HC RX 258: Performed by: EMERGENCY MEDICINE

## 2025-01-20 RX ORDER — 0.9 % SODIUM CHLORIDE 0.9 %
1000 INTRAVENOUS SOLUTION INTRAVENOUS ONCE
Status: COMPLETED | OUTPATIENT
Start: 2025-01-20 | End: 2025-01-20

## 2025-01-20 RX ADMIN — SODIUM CHLORIDE 1000 ML: 9 INJECTION, SOLUTION INTRAVENOUS at 15:11

## 2025-01-20 ASSESSMENT — LIFESTYLE VARIABLES
HOW OFTEN DO YOU HAVE A DRINK CONTAINING ALCOHOL: NEVER
HOW MANY STANDARD DRINKS CONTAINING ALCOHOL DO YOU HAVE ON A TYPICAL DAY: PATIENT DOES NOT DRINK

## 2025-01-20 NOTE — ED TRIAGE NOTES
Pt alert and conversational. Ambulated to triage with cane. C/O palpitations/racing heart. Endorses tightness in chest since last night.     Active Ambulatory Problems     Diagnosis Date Noted    No Active Ambulatory Problems     Resolved Ambulatory Problems     Diagnosis Date Noted    No Resolved Ambulatory Problems     Past Medical History:   Diagnosis Date    Arthritis     Cancer (HCC)     Gastrointestinal disorder     History of penile implant 2014    Hypertension     Sleep apnea

## 2025-01-20 NOTE — ED PROVIDER NOTES
patients with HEART Score of 4 the option of discharge, but only when they meet criteria for \"Low 4,\" meaning that HST was used, and the 4 is not from a highly suspicious story, highly suspicious EKG, or positive cardiac enzymes.  In these selected cases, the risk of a \"Low 4\" is still most likely lower than the risk of admission and further testing/imaging. [id number]    SHARED DECISION MAKING:   I discussed my risk assessment with the patient. The patient understands and consents to the risk of disposition/plan, as well as the risk of uncertainty in estimating outcomes. [id number]          Is this patient to be included in the SEP-1 core measure? No Exclusion criteria - the patient is NOT to be included for SEP-1 Core Measure due to: 2+ SIRS criteria are not met    DDX: ACS, Arrhythmia, Esophageal Rupture/Adeola Cbaral Tear, Musculoskeletal Chest Pain, Pericardial Effusion/Tamponade, Pericarditis, Pneumomediastinum, Pneumonia, PTX/Tension PTX     DISCUSSION:  This appears to be a moderate condition.  This appears to be an acute condition.    79 y.o. male with palpitations starting last night.  In evaluation of the above differential diagnosis, consideration was given to the following tests and treatments.  CBC within normal limits.  CMP unremarkable.  Magnesium unremarkable.  Patient had 2 EKGs without ischemia or arrhythmia.  Chest x-ray was unremarkable.  Patient had 2 troponins in the normal range and not uptrending.  Very unlikely ischemia or arrhythmia given this workup.  Heart rate normalized with IV fluids. Likely tachycardia secondary to dehydration. The decision to perform testing and results were discussed with the patient and spouse.  I discussed each of these tests and considerations with the patient and spouse. They agree with the plan of discharge.      ADDITIONAL CONSIDERATIONS:  None    SMOKING CESSATION:   None    Critical Care Time:     None    Justin Shin MD    Diagnosis and

## 2025-01-20 NOTE — DISCHARGE INSTRUCTIONS
Thank you for choosing [facility]'s Emergency Department for your care. It is our privilege to provide excellent care for you in your time of need. In the next several days, you may receive a survey via mail or email about your experience with our team. We would appreciate you taking a few minutes to complete the survey, as we use this information to learn what we have done well and what we could be doing better. Thank you for trusting us with your care.    -----------------------------------------------------------------------------  Below you will find a list of your tests from today's visit.   Labs  Recent Results (from the past 12 hour(s))   EKG 12 Lead    Collection Time: 01/20/25 12:37 PM   Result Value Ref Range    Ventricular Rate 116 BPM    Atrial Rate 116 BPM    P-R Interval 170 ms    QRS Duration 92 ms    Q-T Interval 342 ms    QTc Calculation (Bazett) 475 ms    P Axis 70 degrees    R Axis -55 degrees    T Axis 56 degrees    Diagnosis       Sinus tachycardia  Left anterior fascicular block  Abnormal ECG  When compared with ECG of 27-AUG-2021 15:58,  Vent. rate has increased BY  43 BPM  Left anterior fascicular block is now present     Troponin    Collection Time: 01/20/25  2:08 PM   Result Value Ref Range    Troponin, High Sensitivity 19 0 - 78 ng/L   CBC with Auto Differential    Collection Time: 01/20/25  2:08 PM   Result Value Ref Range    WBC 6.5 4.6 - 13.2 K/uL    RBC 5.05 4.35 - 5.65 M/uL    Hemoglobin 15.7 13.0 - 16.0 g/dL    Hematocrit 46.6 36.0 - 48.0 %    MCV 92.3 78.0 - 100.0 FL    MCH 31.1 24.0 - 34.0 PG    MCHC 33.7 31.0 - 37.0 g/dL    RDW 13.8 11.6 - 14.5 %    Platelets 229 135 - 420 K/uL    MPV 9.3 9.2 - 11.8 FL    Nucleated RBCs 0.0 0  WBC    nRBC 0.00 0.00 - 0.01 K/uL    Neutrophils % 55.5 40.0 - 73.0 %    Lymphocytes % 34.7 21.0 - 52.0 %    Monocytes % 7.5 3.0 - 10.0 %    Eosinophils % 1.5 0.0 - 5.0 %    Basophils % 0.3 0.0 - 2.0 %    Immature Granulocytes % 0.5

## 2025-01-21 LAB
EKG ATRIAL RATE: 116 BPM
EKG ATRIAL RATE: 89 BPM
EKG DIAGNOSIS: NORMAL
EKG DIAGNOSIS: NORMAL
EKG P AXIS: 70 DEGREES
EKG P AXIS: 78 DEGREES
EKG P-R INTERVAL: 170 MS
EKG P-R INTERVAL: 184 MS
EKG Q-T INTERVAL: 342 MS
EKG Q-T INTERVAL: 380 MS
EKG QRS DURATION: 90 MS
EKG QRS DURATION: 92 MS
EKG QTC CALCULATION (BAZETT): 462 MS
EKG QTC CALCULATION (BAZETT): 475 MS
EKG R AXIS: -55 DEGREES
EKG R AXIS: 40 DEGREES
EKG T AXIS: 56 DEGREES
EKG T AXIS: 62 DEGREES
EKG VENTRICULAR RATE: 116 BPM
EKG VENTRICULAR RATE: 89 BPM

## 2025-01-21 PROCEDURE — 93010 ELECTROCARDIOGRAM REPORT: CPT | Performed by: INTERNAL MEDICINE

## (undated) DEVICE — TRAP SPEC COLL POLYP POLYSTYR --

## (undated) DEVICE — MAJ-1414 SINGLE USE ADPATER BIOPSY VALV: Brand: SINGLE USE ADAPTOR BIOPSY VALVE

## (undated) DEVICE — ENDO CARRY-ON PROCEDURE KIT INCLUDES ENZYMATIC SPONGE, GAUZE, BIOHAZARD LABEL, TRAY, LUBRICANT, DIRTY SCOPE LABEL, WATER LABEL, TRAY, DRAWSTRING PAD, AND DEFENDO 4-PIECE KIT.: Brand: ENDO CARRY-ON PROCEDURE KIT

## (undated) DEVICE — GARMENT,MEDLINE,DVT,INT,CALF,MED, GEN2: Brand: MEDLINE

## (undated) DEVICE — SPONGE GZ W4XL4IN COT 12 PLY TYP VII WVN C FLD DSGN

## (undated) DEVICE — SINGLE PORT MANIFOLD: Brand: NEPTUNE 2

## (undated) DEVICE — REM POLYHESIVE ADULT PATIENT RETURN ELECTRODE: Brand: VALLEYLAB

## (undated) DEVICE — KENDALL RADIOLUCENT FOAM MONITORING ELECTRODE RECTANGULAR SHAPE: Brand: KENDALL

## (undated) DEVICE — MOUTHPIECE ENDOSCP 20X27MM --

## (undated) DEVICE — MEDI-VAC NON-CONDUCTIVE SUCTION TUBING: Brand: CARDINAL HEALTH